# Patient Record
Sex: MALE | Race: WHITE | NOT HISPANIC OR LATINO | Employment: FULL TIME | URBAN - METROPOLITAN AREA
[De-identification: names, ages, dates, MRNs, and addresses within clinical notes are randomized per-mention and may not be internally consistent; named-entity substitution may affect disease eponyms.]

---

## 2017-01-24 ENCOUNTER — HOSPITAL ENCOUNTER (OUTPATIENT)
Facility: HOSPITAL | Age: 47
Setting detail: OUTPATIENT SURGERY
Discharge: HOME | End: 2017-01-24
Attending: ORTHOPAEDIC SURGERY | Admitting: ORTHOPAEDIC SURGERY
Payer: OTHER MISCELLANEOUS

## 2017-01-24 ENCOUNTER — ANESTHESIA (OUTPATIENT)
Dept: PERIOP | Facility: HOSPITAL | Age: 47
End: 2017-01-24
Payer: OTHER MISCELLANEOUS

## 2017-01-24 ENCOUNTER — ANESTHESIA EVENT (OUTPATIENT)
Dept: PERIOP | Facility: HOSPITAL | Age: 47
End: 2017-01-24
Payer: OTHER MISCELLANEOUS

## 2017-01-24 VITALS
TEMPERATURE: 99.3 F | BODY MASS INDEX: 26.77 KG/M2 | OXYGEN SATURATION: 94 % | HEIGHT: 70 IN | DIASTOLIC BLOOD PRESSURE: 99 MMHG | WEIGHT: 187 LBS | HEART RATE: 90 BPM | SYSTOLIC BLOOD PRESSURE: 147 MMHG | RESPIRATION RATE: 18 BRPM

## 2017-01-24 VITALS — DIASTOLIC BLOOD PRESSURE: 77 MMHG | OXYGEN SATURATION: 97 % | HEART RATE: 77 BPM | SYSTOLIC BLOOD PRESSURE: 139 MMHG

## 2017-01-24 PROBLEM — M24.131 ARTICULAR CARTILAGE DISORDER OF RIGHT WRIST: Status: ACTIVE | Noted: 2017-01-24

## 2017-01-24 PROCEDURE — C1713 ANCHOR/SCREW BN/BN,TIS/BN: HCPCS | Performed by: ORTHOPAEDIC SURGERY

## 2017-01-24 DEVICE — TFCC FAST-FIX KIT INCLUDES                                    FAST-FIX 360 DELIVERY DEVICE,                                    DISPOSABLE SLOTTED CANNULA, AND KNOT                                    PUSHER/SUTURE CUTTER
Type: IMPLANTABLE DEVICE | Site: WRIST | Status: FUNCTIONAL
Brand: FAST-FIX

## 2017-01-24 RX ORDER — FENTANYL CITRATE/PF 50 MCG/ML
25 SYRINGE (ML) INJECTION
Status: DISCONTINUED | OUTPATIENT
Start: 2017-01-24 | End: 2017-01-24 | Stop reason: HOSPADM

## 2017-01-24 RX ORDER — ONDANSETRON 2 MG/ML
INJECTION INTRAMUSCULAR; INTRAVENOUS AS NEEDED
Status: DISCONTINUED | OUTPATIENT
Start: 2017-01-24 | End: 2017-01-24 | Stop reason: SURG

## 2017-01-24 RX ORDER — PROPOFOL 10 MG/ML
INJECTION, EMULSION INTRAVENOUS AS NEEDED
Status: DISCONTINUED | OUTPATIENT
Start: 2017-01-24 | End: 2017-01-24 | Stop reason: SURG

## 2017-01-24 RX ORDER — ALBUTEROL SULFATE 90 UG/1
AEROSOL, METERED RESPIRATORY (INHALATION) AS NEEDED
Status: DISCONTINUED | OUTPATIENT
Start: 2017-01-24 | End: 2017-01-24 | Stop reason: SURG

## 2017-01-24 RX ORDER — MIDAZOLAM HYDROCHLORIDE 1 MG/ML
INJECTION INTRAMUSCULAR; INTRAVENOUS
Status: COMPLETED
Start: 2017-01-24 | End: 2017-01-24

## 2017-01-24 RX ORDER — SODIUM CHLORIDE, SODIUM LACTATE, POTASSIUM CHLORIDE, CALCIUM CHLORIDE 600; 310; 30; 20 MG/100ML; MG/100ML; MG/100ML; MG/100ML
20 INJECTION, SOLUTION INTRAVENOUS CONTINUOUS
Status: DISCONTINUED | OUTPATIENT
Start: 2017-01-24 | End: 2017-01-24

## 2017-01-24 RX ORDER — LIDOCAINE HYDROCHLORIDE 10 MG/ML
INJECTION, SOLUTION INFILTRATION; PERINEURAL AS NEEDED
Status: DISCONTINUED | OUTPATIENT
Start: 2017-01-24 | End: 2017-01-24 | Stop reason: SURG

## 2017-01-24 RX ORDER — FENTANYL CITRATE 50 UG/ML
INJECTION, SOLUTION INTRAMUSCULAR; INTRAVENOUS AS NEEDED
Status: DISCONTINUED | OUTPATIENT
Start: 2017-01-24 | End: 2017-01-24 | Stop reason: SURG

## 2017-01-24 RX ORDER — HYDROCODONE BITARTRATE AND ACETAMINOPHEN 5; 325 MG/1; MG/1
2 TABLET ORAL EVERY 6 HOURS PRN
Qty: 40 TABLET | Refills: 0 | Status: SHIPPED | OUTPATIENT
Start: 2017-01-24 | End: 2017-02-03

## 2017-01-24 RX ORDER — SODIUM CHLORIDE, SODIUM LACTATE, POTASSIUM CHLORIDE, CALCIUM CHLORIDE 600; 310; 30; 20 MG/100ML; MG/100ML; MG/100ML; MG/100ML
100 INJECTION, SOLUTION INTRAVENOUS CONTINUOUS
Status: DISCONTINUED | OUTPATIENT
Start: 2017-01-24 | End: 2017-01-24 | Stop reason: HOSPADM

## 2017-01-24 RX ORDER — MAGNESIUM HYDROXIDE 1200 MG/15ML
LIQUID ORAL AS NEEDED
Status: DISCONTINUED | OUTPATIENT
Start: 2017-01-24 | End: 2017-01-24 | Stop reason: HOSPADM

## 2017-01-24 RX ORDER — HYDROCODONE BITARTRATE AND ACETAMINOPHEN 5; 325 MG/1; MG/1
2 TABLET ORAL EVERY 6 HOURS PRN
Status: DISCONTINUED | OUTPATIENT
Start: 2017-01-24 | End: 2017-01-24 | Stop reason: HOSPADM

## 2017-01-24 RX ORDER — FENTANYL CITRATE 50 UG/ML
INJECTION, SOLUTION INTRAMUSCULAR; INTRAVENOUS
Status: COMPLETED
Start: 2017-01-24 | End: 2017-01-24

## 2017-01-24 RX ADMIN — HYDROCODONE BITARTRATE AND ACETAMINOPHEN 2 TABLET: 5; 325 TABLET ORAL at 14:01

## 2017-01-24 RX ADMIN — PROPOFOL 200 MG: 10 INJECTION, EMULSION INTRAVENOUS at 11:29

## 2017-01-24 RX ADMIN — FENTANYL CITRATE 25 MCG: 50 INJECTION, SOLUTION INTRAMUSCULAR; INTRAVENOUS at 11:39

## 2017-01-24 RX ADMIN — ALBUTEROL SULFATE 4 PUFF: 90 AEROSOL, METERED RESPIRATORY (INHALATION) at 11:43

## 2017-01-24 RX ADMIN — ALBUTEROL SULFATE 4 PUFF: 90 AEROSOL, METERED RESPIRATORY (INHALATION) at 11:40

## 2017-01-24 RX ADMIN — SODIUM CHLORIDE, SODIUM LACTATE, POTASSIUM CHLORIDE, AND CALCIUM CHLORIDE 20 ML/HR: .6; .31; .03; .02 INJECTION, SOLUTION INTRAVENOUS at 09:00

## 2017-01-24 RX ADMIN — FENTANYL CITRATE 50 MCG: 50 INJECTION INTRAMUSCULAR; INTRAVENOUS at 10:40

## 2017-01-24 RX ADMIN — CEFAZOLIN SODIUM 2000 MG: 2 SOLUTION INTRAVENOUS at 11:33

## 2017-01-24 RX ADMIN — DEXAMETHASONE SODIUM PHOSPHATE 10 MG: 10 INJECTION INTRAMUSCULAR; INTRAVENOUS at 11:48

## 2017-01-24 RX ADMIN — MIDAZOLAM HYDROCHLORIDE 2 MG: 1 INJECTION, SOLUTION INTRAMUSCULAR; INTRAVENOUS at 10:39

## 2017-01-24 RX ADMIN — LIDOCAINE HYDROCHLORIDE 50 MG: 10 INJECTION, SOLUTION INFILTRATION; PERINEURAL at 11:29

## 2017-01-24 RX ADMIN — ONDANSETRON 4 MG: 2 INJECTION INTRAMUSCULAR; INTRAVENOUS at 12:32

## 2017-01-24 RX ADMIN — FENTANYL CITRATE 25 MCG: 50 INJECTION, SOLUTION INTRAMUSCULAR; INTRAVENOUS at 11:33

## 2017-02-06 ENCOUNTER — ALLSCRIPTS OFFICE VISIT (OUTPATIENT)
Dept: OTHER | Facility: OTHER | Age: 47
End: 2017-02-06

## 2017-03-06 ENCOUNTER — ALLSCRIPTS OFFICE VISIT (OUTPATIENT)
Dept: OTHER | Facility: OTHER | Age: 47
End: 2017-03-06

## 2017-03-06 DIAGNOSIS — S69.80XA OTHER SPECIFIED INJURIES OF UNSPECIFIED WRIST, HAND AND FINGER(S), INITIAL ENCOUNTER: ICD-10-CM

## 2017-03-20 ENCOUNTER — APPOINTMENT (OUTPATIENT)
Dept: OCCUPATIONAL THERAPY | Facility: CLINIC | Age: 47
End: 2017-03-20
Payer: OTHER MISCELLANEOUS

## 2017-03-20 DIAGNOSIS — S69.80XA OTHER SPECIFIED INJURIES OF UNSPECIFIED WRIST, HAND AND FINGER(S), INITIAL ENCOUNTER: ICD-10-CM

## 2017-03-20 PROCEDURE — 97010 HOT OR COLD PACKS THERAPY: CPT

## 2017-03-20 PROCEDURE — 97165 OT EVAL LOW COMPLEX 30 MIN: CPT

## 2017-03-20 PROCEDURE — 97140 MANUAL THERAPY 1/> REGIONS: CPT

## 2017-03-22 ENCOUNTER — APPOINTMENT (OUTPATIENT)
Dept: OCCUPATIONAL THERAPY | Facility: CLINIC | Age: 47
End: 2017-03-22
Payer: OTHER MISCELLANEOUS

## 2017-03-22 PROCEDURE — 97010 HOT OR COLD PACKS THERAPY: CPT

## 2017-03-22 PROCEDURE — 97140 MANUAL THERAPY 1/> REGIONS: CPT

## 2017-03-22 PROCEDURE — 97110 THERAPEUTIC EXERCISES: CPT

## 2017-03-23 ENCOUNTER — APPOINTMENT (OUTPATIENT)
Dept: OCCUPATIONAL THERAPY | Facility: CLINIC | Age: 47
End: 2017-03-23
Payer: OTHER MISCELLANEOUS

## 2017-03-23 PROCEDURE — 97110 THERAPEUTIC EXERCISES: CPT

## 2017-03-23 PROCEDURE — 97140 MANUAL THERAPY 1/> REGIONS: CPT

## 2017-03-23 PROCEDURE — 97010 HOT OR COLD PACKS THERAPY: CPT

## 2017-03-27 ENCOUNTER — APPOINTMENT (OUTPATIENT)
Dept: OCCUPATIONAL THERAPY | Facility: CLINIC | Age: 47
End: 2017-03-27
Payer: OTHER MISCELLANEOUS

## 2017-03-27 PROCEDURE — 97010 HOT OR COLD PACKS THERAPY: CPT

## 2017-03-27 PROCEDURE — 97110 THERAPEUTIC EXERCISES: CPT

## 2017-03-27 PROCEDURE — 97022 WHIRLPOOL THERAPY: CPT

## 2017-03-27 PROCEDURE — 97140 MANUAL THERAPY 1/> REGIONS: CPT

## 2017-03-29 ENCOUNTER — APPOINTMENT (OUTPATIENT)
Dept: OCCUPATIONAL THERAPY | Facility: CLINIC | Age: 47
End: 2017-03-29
Payer: OTHER MISCELLANEOUS

## 2017-03-29 PROCEDURE — 97010 HOT OR COLD PACKS THERAPY: CPT

## 2017-03-29 PROCEDURE — 97140 MANUAL THERAPY 1/> REGIONS: CPT

## 2017-03-29 PROCEDURE — 97022 WHIRLPOOL THERAPY: CPT

## 2017-03-29 PROCEDURE — 97110 THERAPEUTIC EXERCISES: CPT

## 2017-03-31 ENCOUNTER — APPOINTMENT (OUTPATIENT)
Dept: OCCUPATIONAL THERAPY | Facility: CLINIC | Age: 47
End: 2017-03-31
Payer: OTHER MISCELLANEOUS

## 2017-03-31 PROCEDURE — 97010 HOT OR COLD PACKS THERAPY: CPT

## 2017-03-31 PROCEDURE — 97140 MANUAL THERAPY 1/> REGIONS: CPT

## 2017-03-31 PROCEDURE — 97022 WHIRLPOOL THERAPY: CPT

## 2017-03-31 PROCEDURE — 97110 THERAPEUTIC EXERCISES: CPT

## 2017-04-03 ENCOUNTER — APPOINTMENT (OUTPATIENT)
Dept: OCCUPATIONAL THERAPY | Facility: CLINIC | Age: 47
End: 2017-04-03
Payer: OTHER MISCELLANEOUS

## 2017-04-03 PROCEDURE — 97022 WHIRLPOOL THERAPY: CPT

## 2017-04-03 PROCEDURE — 97110 THERAPEUTIC EXERCISES: CPT

## 2017-04-03 PROCEDURE — 97035 APP MDLTY 1+ULTRASOUND EA 15: CPT

## 2017-04-03 PROCEDURE — 97140 MANUAL THERAPY 1/> REGIONS: CPT

## 2017-04-05 ENCOUNTER — APPOINTMENT (OUTPATIENT)
Dept: OCCUPATIONAL THERAPY | Facility: CLINIC | Age: 47
End: 2017-04-05
Payer: OTHER MISCELLANEOUS

## 2017-04-05 PROCEDURE — 97140 MANUAL THERAPY 1/> REGIONS: CPT

## 2017-04-05 PROCEDURE — 97010 HOT OR COLD PACKS THERAPY: CPT

## 2017-04-05 PROCEDURE — 97035 APP MDLTY 1+ULTRASOUND EA 15: CPT

## 2017-04-05 PROCEDURE — 97110 THERAPEUTIC EXERCISES: CPT

## 2017-04-06 ENCOUNTER — APPOINTMENT (OUTPATIENT)
Dept: OCCUPATIONAL THERAPY | Facility: CLINIC | Age: 47
End: 2017-04-06
Payer: OTHER MISCELLANEOUS

## 2017-04-06 PROCEDURE — 97140 MANUAL THERAPY 1/> REGIONS: CPT

## 2017-04-06 PROCEDURE — 97010 HOT OR COLD PACKS THERAPY: CPT

## 2017-04-06 PROCEDURE — 97035 APP MDLTY 1+ULTRASOUND EA 15: CPT

## 2017-04-06 PROCEDURE — 97110 THERAPEUTIC EXERCISES: CPT

## 2017-04-07 ENCOUNTER — APPOINTMENT (OUTPATIENT)
Dept: OCCUPATIONAL THERAPY | Facility: CLINIC | Age: 47
End: 2017-04-07
Payer: OTHER MISCELLANEOUS

## 2017-04-10 ENCOUNTER — APPOINTMENT (OUTPATIENT)
Dept: OCCUPATIONAL THERAPY | Facility: CLINIC | Age: 47
End: 2017-04-10
Payer: OTHER MISCELLANEOUS

## 2017-04-10 PROCEDURE — 97014 ELECTRIC STIMULATION THERAPY: CPT

## 2017-04-10 PROCEDURE — 97140 MANUAL THERAPY 1/> REGIONS: CPT

## 2017-04-10 PROCEDURE — 97035 APP MDLTY 1+ULTRASOUND EA 15: CPT

## 2017-04-10 PROCEDURE — 97110 THERAPEUTIC EXERCISES: CPT

## 2017-04-12 ENCOUNTER — APPOINTMENT (OUTPATIENT)
Dept: OCCUPATIONAL THERAPY | Facility: CLINIC | Age: 47
End: 2017-04-12
Payer: OTHER MISCELLANEOUS

## 2017-04-12 PROCEDURE — 97110 THERAPEUTIC EXERCISES: CPT

## 2017-04-12 PROCEDURE — 97140 MANUAL THERAPY 1/> REGIONS: CPT

## 2017-04-12 PROCEDURE — 97035 APP MDLTY 1+ULTRASOUND EA 15: CPT

## 2017-04-12 PROCEDURE — 97014 ELECTRIC STIMULATION THERAPY: CPT

## 2017-04-14 ENCOUNTER — APPOINTMENT (OUTPATIENT)
Dept: OCCUPATIONAL THERAPY | Facility: CLINIC | Age: 47
End: 2017-04-14
Payer: OTHER MISCELLANEOUS

## 2017-04-14 PROCEDURE — 97140 MANUAL THERAPY 1/> REGIONS: CPT

## 2017-04-14 PROCEDURE — 97014 ELECTRIC STIMULATION THERAPY: CPT

## 2017-04-14 PROCEDURE — 97110 THERAPEUTIC EXERCISES: CPT

## 2017-04-14 PROCEDURE — 97035 APP MDLTY 1+ULTRASOUND EA 15: CPT

## 2017-04-17 ENCOUNTER — APPOINTMENT (OUTPATIENT)
Dept: OCCUPATIONAL THERAPY | Facility: CLINIC | Age: 47
End: 2017-04-17
Payer: OTHER MISCELLANEOUS

## 2017-04-17 ENCOUNTER — ALLSCRIPTS OFFICE VISIT (OUTPATIENT)
Dept: OTHER | Facility: OTHER | Age: 47
End: 2017-04-17

## 2017-04-17 DIAGNOSIS — S69.80XA OTHER SPECIFIED INJURIES OF UNSPECIFIED WRIST, HAND AND FINGER(S), INITIAL ENCOUNTER: ICD-10-CM

## 2017-04-17 PROCEDURE — 97014 ELECTRIC STIMULATION THERAPY: CPT

## 2017-04-17 PROCEDURE — 97010 HOT OR COLD PACKS THERAPY: CPT

## 2017-04-17 PROCEDURE — 97110 THERAPEUTIC EXERCISES: CPT

## 2017-04-17 PROCEDURE — 97140 MANUAL THERAPY 1/> REGIONS: CPT

## 2017-04-19 ENCOUNTER — APPOINTMENT (OUTPATIENT)
Dept: OCCUPATIONAL THERAPY | Facility: CLINIC | Age: 47
End: 2017-04-19
Payer: OTHER MISCELLANEOUS

## 2017-04-19 PROCEDURE — 97110 THERAPEUTIC EXERCISES: CPT

## 2017-04-19 PROCEDURE — 97140 MANUAL THERAPY 1/> REGIONS: CPT

## 2017-04-19 PROCEDURE — 97010 HOT OR COLD PACKS THERAPY: CPT

## 2017-04-19 PROCEDURE — 97014 ELECTRIC STIMULATION THERAPY: CPT

## 2017-04-21 ENCOUNTER — APPOINTMENT (OUTPATIENT)
Dept: OCCUPATIONAL THERAPY | Facility: CLINIC | Age: 47
End: 2017-04-21
Payer: OTHER MISCELLANEOUS

## 2017-04-21 PROCEDURE — 97110 THERAPEUTIC EXERCISES: CPT

## 2017-04-21 PROCEDURE — 97140 MANUAL THERAPY 1/> REGIONS: CPT

## 2017-04-21 PROCEDURE — 97014 ELECTRIC STIMULATION THERAPY: CPT

## 2017-04-21 PROCEDURE — 97022 WHIRLPOOL THERAPY: CPT

## 2017-04-24 ENCOUNTER — APPOINTMENT (OUTPATIENT)
Dept: OCCUPATIONAL THERAPY | Facility: CLINIC | Age: 47
End: 2017-04-24
Payer: OTHER MISCELLANEOUS

## 2017-04-24 PROCEDURE — 97035 APP MDLTY 1+ULTRASOUND EA 15: CPT

## 2017-04-24 PROCEDURE — 97140 MANUAL THERAPY 1/> REGIONS: CPT

## 2017-04-24 PROCEDURE — 97014 ELECTRIC STIMULATION THERAPY: CPT

## 2017-04-24 PROCEDURE — 97110 THERAPEUTIC EXERCISES: CPT

## 2017-04-26 ENCOUNTER — APPOINTMENT (OUTPATIENT)
Dept: OCCUPATIONAL THERAPY | Facility: CLINIC | Age: 47
End: 2017-04-26
Payer: OTHER MISCELLANEOUS

## 2017-04-26 PROCEDURE — 97014 ELECTRIC STIMULATION THERAPY: CPT

## 2017-04-26 PROCEDURE — 97010 HOT OR COLD PACKS THERAPY: CPT

## 2017-04-26 PROCEDURE — 97110 THERAPEUTIC EXERCISES: CPT

## 2017-04-26 PROCEDURE — 97140 MANUAL THERAPY 1/> REGIONS: CPT

## 2017-04-28 ENCOUNTER — APPOINTMENT (OUTPATIENT)
Dept: OCCUPATIONAL THERAPY | Facility: CLINIC | Age: 47
End: 2017-04-28
Payer: OTHER MISCELLANEOUS

## 2017-04-28 PROCEDURE — 97110 THERAPEUTIC EXERCISES: CPT

## 2017-04-28 PROCEDURE — 97140 MANUAL THERAPY 1/> REGIONS: CPT

## 2017-05-01 ENCOUNTER — APPOINTMENT (OUTPATIENT)
Dept: OCCUPATIONAL THERAPY | Facility: CLINIC | Age: 47
End: 2017-05-01
Payer: OTHER MISCELLANEOUS

## 2017-05-01 PROCEDURE — 97010 HOT OR COLD PACKS THERAPY: CPT

## 2017-05-01 PROCEDURE — 97014 ELECTRIC STIMULATION THERAPY: CPT

## 2017-05-01 PROCEDURE — 97140 MANUAL THERAPY 1/> REGIONS: CPT

## 2017-05-01 PROCEDURE — 97110 THERAPEUTIC EXERCISES: CPT

## 2017-05-03 ENCOUNTER — APPOINTMENT (OUTPATIENT)
Dept: OCCUPATIONAL THERAPY | Facility: CLINIC | Age: 47
End: 2017-05-03
Payer: OTHER MISCELLANEOUS

## 2017-05-03 PROCEDURE — 97140 MANUAL THERAPY 1/> REGIONS: CPT

## 2017-05-03 PROCEDURE — 97014 ELECTRIC STIMULATION THERAPY: CPT

## 2017-05-03 PROCEDURE — 97110 THERAPEUTIC EXERCISES: CPT

## 2017-05-05 ENCOUNTER — APPOINTMENT (OUTPATIENT)
Dept: OCCUPATIONAL THERAPY | Facility: CLINIC | Age: 47
End: 2017-05-05
Payer: OTHER MISCELLANEOUS

## 2017-05-05 PROCEDURE — 97110 THERAPEUTIC EXERCISES: CPT

## 2017-05-05 PROCEDURE — 97010 HOT OR COLD PACKS THERAPY: CPT

## 2017-05-05 PROCEDURE — 97140 MANUAL THERAPY 1/> REGIONS: CPT

## 2017-05-08 ENCOUNTER — APPOINTMENT (OUTPATIENT)
Dept: OCCUPATIONAL THERAPY | Facility: CLINIC | Age: 47
End: 2017-05-08
Payer: OTHER MISCELLANEOUS

## 2017-05-08 PROCEDURE — 97140 MANUAL THERAPY 1/> REGIONS: CPT

## 2017-05-08 PROCEDURE — 97110 THERAPEUTIC EXERCISES: CPT

## 2017-05-08 PROCEDURE — 97035 APP MDLTY 1+ULTRASOUND EA 15: CPT

## 2017-05-08 PROCEDURE — 97014 ELECTRIC STIMULATION THERAPY: CPT

## 2017-05-10 ENCOUNTER — APPOINTMENT (OUTPATIENT)
Dept: OCCUPATIONAL THERAPY | Facility: CLINIC | Age: 47
End: 2017-05-10
Payer: OTHER MISCELLANEOUS

## 2017-05-10 PROCEDURE — 97035 APP MDLTY 1+ULTRASOUND EA 15: CPT

## 2017-05-10 PROCEDURE — 97014 ELECTRIC STIMULATION THERAPY: CPT

## 2017-05-10 PROCEDURE — 97110 THERAPEUTIC EXERCISES: CPT

## 2017-05-10 PROCEDURE — 97140 MANUAL THERAPY 1/> REGIONS: CPT

## 2017-05-15 ENCOUNTER — APPOINTMENT (OUTPATIENT)
Dept: OCCUPATIONAL THERAPY | Facility: CLINIC | Age: 47
End: 2017-05-15
Payer: OTHER MISCELLANEOUS

## 2017-05-15 PROCEDURE — 97140 MANUAL THERAPY 1/> REGIONS: CPT

## 2017-05-15 PROCEDURE — 97110 THERAPEUTIC EXERCISES: CPT

## 2017-05-15 PROCEDURE — 97014 ELECTRIC STIMULATION THERAPY: CPT

## 2017-05-17 ENCOUNTER — APPOINTMENT (OUTPATIENT)
Dept: OCCUPATIONAL THERAPY | Facility: CLINIC | Age: 47
End: 2017-05-17
Payer: OTHER MISCELLANEOUS

## 2017-05-17 PROCEDURE — 97140 MANUAL THERAPY 1/> REGIONS: CPT

## 2017-05-17 PROCEDURE — 97010 HOT OR COLD PACKS THERAPY: CPT

## 2017-05-17 PROCEDURE — 97110 THERAPEUTIC EXERCISES: CPT

## 2017-05-19 ENCOUNTER — APPOINTMENT (OUTPATIENT)
Dept: OCCUPATIONAL THERAPY | Facility: CLINIC | Age: 47
End: 2017-05-19
Payer: OTHER MISCELLANEOUS

## 2017-05-19 PROCEDURE — 97140 MANUAL THERAPY 1/> REGIONS: CPT

## 2017-05-19 PROCEDURE — 97010 HOT OR COLD PACKS THERAPY: CPT

## 2017-05-19 PROCEDURE — 97110 THERAPEUTIC EXERCISES: CPT

## 2017-05-22 ENCOUNTER — APPOINTMENT (OUTPATIENT)
Dept: OCCUPATIONAL THERAPY | Facility: CLINIC | Age: 47
End: 2017-05-22
Payer: OTHER MISCELLANEOUS

## 2017-05-22 PROCEDURE — 97010 HOT OR COLD PACKS THERAPY: CPT

## 2017-05-22 PROCEDURE — 97140 MANUAL THERAPY 1/> REGIONS: CPT

## 2017-05-22 PROCEDURE — 97110 THERAPEUTIC EXERCISES: CPT

## 2017-05-24 ENCOUNTER — APPOINTMENT (OUTPATIENT)
Dept: OCCUPATIONAL THERAPY | Facility: CLINIC | Age: 47
End: 2017-05-24
Payer: OTHER MISCELLANEOUS

## 2017-05-24 PROCEDURE — 97140 MANUAL THERAPY 1/> REGIONS: CPT

## 2017-05-24 PROCEDURE — 97110 THERAPEUTIC EXERCISES: CPT

## 2017-05-24 PROCEDURE — 97010 HOT OR COLD PACKS THERAPY: CPT

## 2017-05-26 ENCOUNTER — APPOINTMENT (OUTPATIENT)
Dept: OCCUPATIONAL THERAPY | Facility: CLINIC | Age: 47
End: 2017-05-26
Payer: OTHER MISCELLANEOUS

## 2017-05-26 PROCEDURE — 97110 THERAPEUTIC EXERCISES: CPT

## 2017-05-26 PROCEDURE — 97140 MANUAL THERAPY 1/> REGIONS: CPT

## 2017-05-26 PROCEDURE — 97010 HOT OR COLD PACKS THERAPY: CPT

## 2017-05-31 ENCOUNTER — APPOINTMENT (OUTPATIENT)
Dept: OCCUPATIONAL THERAPY | Facility: CLINIC | Age: 47
End: 2017-05-31
Payer: OTHER MISCELLANEOUS

## 2017-05-31 PROCEDURE — 97014 ELECTRIC STIMULATION THERAPY: CPT

## 2017-05-31 PROCEDURE — 97110 THERAPEUTIC EXERCISES: CPT

## 2017-05-31 PROCEDURE — 97140 MANUAL THERAPY 1/> REGIONS: CPT

## 2017-06-01 ENCOUNTER — APPOINTMENT (OUTPATIENT)
Dept: OCCUPATIONAL THERAPY | Facility: CLINIC | Age: 47
End: 2017-06-01
Payer: OTHER MISCELLANEOUS

## 2017-06-01 PROCEDURE — 97110 THERAPEUTIC EXERCISES: CPT

## 2017-06-01 PROCEDURE — 97014 ELECTRIC STIMULATION THERAPY: CPT

## 2017-06-01 PROCEDURE — 97140 MANUAL THERAPY 1/> REGIONS: CPT

## 2017-06-02 ENCOUNTER — APPOINTMENT (OUTPATIENT)
Dept: OCCUPATIONAL THERAPY | Facility: CLINIC | Age: 47
End: 2017-06-02
Payer: OTHER MISCELLANEOUS

## 2017-06-05 ENCOUNTER — ALLSCRIPTS OFFICE VISIT (OUTPATIENT)
Dept: OTHER | Facility: OTHER | Age: 47
End: 2017-06-05

## 2017-06-07 ENCOUNTER — APPOINTMENT (OUTPATIENT)
Dept: OCCUPATIONAL THERAPY | Facility: CLINIC | Age: 47
End: 2017-06-07
Payer: OTHER MISCELLANEOUS

## 2017-06-07 PROCEDURE — 97140 MANUAL THERAPY 1/> REGIONS: CPT

## 2017-06-07 PROCEDURE — 97035 APP MDLTY 1+ULTRASOUND EA 15: CPT

## 2017-06-07 PROCEDURE — 97110 THERAPEUTIC EXERCISES: CPT

## 2017-06-07 PROCEDURE — 97010 HOT OR COLD PACKS THERAPY: CPT

## 2017-06-09 ENCOUNTER — APPOINTMENT (OUTPATIENT)
Dept: OCCUPATIONAL THERAPY | Facility: CLINIC | Age: 47
End: 2017-06-09
Payer: OTHER MISCELLANEOUS

## 2017-06-09 PROCEDURE — 97010 HOT OR COLD PACKS THERAPY: CPT

## 2017-06-09 PROCEDURE — 97110 THERAPEUTIC EXERCISES: CPT

## 2017-06-09 PROCEDURE — 97140 MANUAL THERAPY 1/> REGIONS: CPT

## 2017-06-09 PROCEDURE — 97035 APP MDLTY 1+ULTRASOUND EA 15: CPT

## 2017-06-12 ENCOUNTER — APPOINTMENT (OUTPATIENT)
Dept: OCCUPATIONAL THERAPY | Facility: CLINIC | Age: 47
End: 2017-06-12
Payer: OTHER MISCELLANEOUS

## 2017-06-12 PROCEDURE — 97140 MANUAL THERAPY 1/> REGIONS: CPT

## 2017-06-12 PROCEDURE — 97110 THERAPEUTIC EXERCISES: CPT

## 2017-06-12 PROCEDURE — 97010 HOT OR COLD PACKS THERAPY: CPT

## 2017-06-14 ENCOUNTER — APPOINTMENT (OUTPATIENT)
Dept: OCCUPATIONAL THERAPY | Facility: CLINIC | Age: 47
End: 2017-06-14
Payer: OTHER MISCELLANEOUS

## 2017-06-14 PROCEDURE — 97140 MANUAL THERAPY 1/> REGIONS: CPT

## 2017-06-14 PROCEDURE — 97010 HOT OR COLD PACKS THERAPY: CPT

## 2017-06-14 PROCEDURE — 97110 THERAPEUTIC EXERCISES: CPT

## 2017-06-16 ENCOUNTER — APPOINTMENT (OUTPATIENT)
Dept: OCCUPATIONAL THERAPY | Facility: CLINIC | Age: 47
End: 2017-06-16
Payer: OTHER MISCELLANEOUS

## 2017-06-16 PROCEDURE — 97010 HOT OR COLD PACKS THERAPY: CPT

## 2017-06-16 PROCEDURE — 97110 THERAPEUTIC EXERCISES: CPT

## 2017-06-16 PROCEDURE — 97140 MANUAL THERAPY 1/> REGIONS: CPT

## 2017-06-19 ENCOUNTER — APPOINTMENT (OUTPATIENT)
Dept: OCCUPATIONAL THERAPY | Facility: CLINIC | Age: 47
End: 2017-06-19
Payer: OTHER MISCELLANEOUS

## 2017-06-21 ENCOUNTER — APPOINTMENT (OUTPATIENT)
Dept: OCCUPATIONAL THERAPY | Facility: CLINIC | Age: 47
End: 2017-06-21
Payer: OTHER MISCELLANEOUS

## 2017-06-21 PROCEDURE — 97010 HOT OR COLD PACKS THERAPY: CPT

## 2017-06-21 PROCEDURE — 97110 THERAPEUTIC EXERCISES: CPT

## 2017-06-21 PROCEDURE — 97140 MANUAL THERAPY 1/> REGIONS: CPT

## 2017-06-23 ENCOUNTER — APPOINTMENT (OUTPATIENT)
Dept: OCCUPATIONAL THERAPY | Facility: CLINIC | Age: 47
End: 2017-06-23
Payer: OTHER MISCELLANEOUS

## 2017-06-23 PROCEDURE — 97110 THERAPEUTIC EXERCISES: CPT

## 2017-06-23 PROCEDURE — 97010 HOT OR COLD PACKS THERAPY: CPT

## 2017-06-23 PROCEDURE — 97140 MANUAL THERAPY 1/> REGIONS: CPT

## 2017-06-26 ENCOUNTER — APPOINTMENT (OUTPATIENT)
Dept: OCCUPATIONAL THERAPY | Facility: CLINIC | Age: 47
End: 2017-06-26
Payer: OTHER MISCELLANEOUS

## 2017-06-27 ENCOUNTER — APPOINTMENT (OUTPATIENT)
Dept: OCCUPATIONAL THERAPY | Facility: CLINIC | Age: 47
End: 2017-06-27
Payer: OTHER MISCELLANEOUS

## 2017-06-27 PROCEDURE — 97110 THERAPEUTIC EXERCISES: CPT

## 2017-06-27 PROCEDURE — 97010 HOT OR COLD PACKS THERAPY: CPT

## 2017-06-27 PROCEDURE — 97140 MANUAL THERAPY 1/> REGIONS: CPT

## 2017-06-28 ENCOUNTER — APPOINTMENT (OUTPATIENT)
Dept: OCCUPATIONAL THERAPY | Facility: CLINIC | Age: 47
End: 2017-06-28
Payer: OTHER MISCELLANEOUS

## 2017-06-28 PROCEDURE — 97110 THERAPEUTIC EXERCISES: CPT

## 2017-06-28 PROCEDURE — 97010 HOT OR COLD PACKS THERAPY: CPT

## 2017-06-28 PROCEDURE — 97140 MANUAL THERAPY 1/> REGIONS: CPT

## 2017-06-30 ENCOUNTER — APPOINTMENT (OUTPATIENT)
Dept: OCCUPATIONAL THERAPY | Facility: CLINIC | Age: 47
End: 2017-06-30
Payer: OTHER MISCELLANEOUS

## 2017-06-30 PROCEDURE — 97110 THERAPEUTIC EXERCISES: CPT

## 2017-06-30 PROCEDURE — 97140 MANUAL THERAPY 1/> REGIONS: CPT

## 2017-06-30 PROCEDURE — 97010 HOT OR COLD PACKS THERAPY: CPT

## 2017-07-06 ENCOUNTER — APPOINTMENT (OUTPATIENT)
Dept: OCCUPATIONAL THERAPY | Facility: CLINIC | Age: 47
End: 2017-07-06
Payer: OTHER MISCELLANEOUS

## 2017-07-06 PROCEDURE — 97140 MANUAL THERAPY 1/> REGIONS: CPT

## 2017-07-06 PROCEDURE — 97010 HOT OR COLD PACKS THERAPY: CPT

## 2017-07-06 PROCEDURE — 97110 THERAPEUTIC EXERCISES: CPT

## 2017-07-07 ENCOUNTER — APPOINTMENT (OUTPATIENT)
Dept: OCCUPATIONAL THERAPY | Facility: CLINIC | Age: 47
End: 2017-07-07
Payer: OTHER MISCELLANEOUS

## 2017-07-07 PROCEDURE — 97140 MANUAL THERAPY 1/> REGIONS: CPT

## 2017-07-07 PROCEDURE — 97110 THERAPEUTIC EXERCISES: CPT

## 2017-07-07 PROCEDURE — 97010 HOT OR COLD PACKS THERAPY: CPT

## 2017-07-10 ENCOUNTER — APPOINTMENT (OUTPATIENT)
Dept: OCCUPATIONAL THERAPY | Facility: CLINIC | Age: 47
End: 2017-07-10
Payer: OTHER MISCELLANEOUS

## 2017-07-12 ENCOUNTER — APPOINTMENT (OUTPATIENT)
Dept: OCCUPATIONAL THERAPY | Facility: CLINIC | Age: 47
End: 2017-07-12
Payer: OTHER MISCELLANEOUS

## 2017-07-12 PROCEDURE — 97110 THERAPEUTIC EXERCISES: CPT

## 2017-07-12 PROCEDURE — 97010 HOT OR COLD PACKS THERAPY: CPT

## 2017-07-12 PROCEDURE — 97140 MANUAL THERAPY 1/> REGIONS: CPT

## 2017-07-13 ENCOUNTER — APPOINTMENT (OUTPATIENT)
Dept: OCCUPATIONAL THERAPY | Facility: CLINIC | Age: 47
End: 2017-07-13
Payer: OTHER MISCELLANEOUS

## 2017-07-17 ENCOUNTER — APPOINTMENT (OUTPATIENT)
Dept: OCCUPATIONAL THERAPY | Facility: CLINIC | Age: 47
End: 2017-07-17
Payer: OTHER MISCELLANEOUS

## 2017-07-17 PROCEDURE — 97140 MANUAL THERAPY 1/> REGIONS: CPT

## 2017-07-17 PROCEDURE — 97110 THERAPEUTIC EXERCISES: CPT

## 2017-07-17 PROCEDURE — 97010 HOT OR COLD PACKS THERAPY: CPT

## 2017-07-19 ENCOUNTER — APPOINTMENT (OUTPATIENT)
Dept: OCCUPATIONAL THERAPY | Facility: CLINIC | Age: 47
End: 2017-07-19
Payer: OTHER MISCELLANEOUS

## 2017-07-19 PROCEDURE — 97140 MANUAL THERAPY 1/> REGIONS: CPT

## 2017-07-19 PROCEDURE — 97010 HOT OR COLD PACKS THERAPY: CPT

## 2017-07-19 PROCEDURE — 97110 THERAPEUTIC EXERCISES: CPT

## 2017-07-21 ENCOUNTER — APPOINTMENT (OUTPATIENT)
Dept: OCCUPATIONAL THERAPY | Facility: CLINIC | Age: 47
End: 2017-07-21
Payer: OTHER MISCELLANEOUS

## 2017-07-21 PROCEDURE — 97140 MANUAL THERAPY 1/> REGIONS: CPT

## 2017-07-21 PROCEDURE — 97010 HOT OR COLD PACKS THERAPY: CPT

## 2017-07-21 PROCEDURE — 97110 THERAPEUTIC EXERCISES: CPT

## 2017-07-24 ENCOUNTER — ALLSCRIPTS OFFICE VISIT (OUTPATIENT)
Dept: OTHER | Facility: OTHER | Age: 47
End: 2017-07-24

## 2017-07-24 DIAGNOSIS — S69.80XA OTHER SPECIFIED INJURIES OF UNSPECIFIED WRIST, HAND AND FINGER(S), INITIAL ENCOUNTER: ICD-10-CM

## 2017-08-01 ENCOUNTER — APPOINTMENT (OUTPATIENT)
Dept: OCCUPATIONAL THERAPY | Facility: CLINIC | Age: 47
End: 2017-08-01
Payer: OTHER MISCELLANEOUS

## 2017-08-01 PROCEDURE — 97140 MANUAL THERAPY 1/> REGIONS: CPT

## 2017-08-01 PROCEDURE — 97110 THERAPEUTIC EXERCISES: CPT

## 2017-08-01 PROCEDURE — 97010 HOT OR COLD PACKS THERAPY: CPT

## 2017-08-03 ENCOUNTER — APPOINTMENT (OUTPATIENT)
Dept: OCCUPATIONAL THERAPY | Facility: CLINIC | Age: 47
End: 2017-08-03
Payer: OTHER MISCELLANEOUS

## 2017-08-03 PROCEDURE — 97110 THERAPEUTIC EXERCISES: CPT

## 2017-08-03 PROCEDURE — 97140 MANUAL THERAPY 1/> REGIONS: CPT

## 2017-08-08 ENCOUNTER — APPOINTMENT (OUTPATIENT)
Dept: OCCUPATIONAL THERAPY | Facility: CLINIC | Age: 47
End: 2017-08-08
Payer: OTHER MISCELLANEOUS

## 2017-08-08 PROCEDURE — 97140 MANUAL THERAPY 1/> REGIONS: CPT

## 2017-08-08 PROCEDURE — 97010 HOT OR COLD PACKS THERAPY: CPT

## 2017-08-08 PROCEDURE — 97110 THERAPEUTIC EXERCISES: CPT

## 2017-08-10 ENCOUNTER — APPOINTMENT (OUTPATIENT)
Dept: OCCUPATIONAL THERAPY | Facility: CLINIC | Age: 47
End: 2017-08-10
Payer: OTHER MISCELLANEOUS

## 2017-08-10 PROCEDURE — 97010 HOT OR COLD PACKS THERAPY: CPT

## 2017-08-10 PROCEDURE — 97110 THERAPEUTIC EXERCISES: CPT

## 2017-08-10 PROCEDURE — 97140 MANUAL THERAPY 1/> REGIONS: CPT

## 2017-08-15 ENCOUNTER — APPOINTMENT (OUTPATIENT)
Dept: OCCUPATIONAL THERAPY | Facility: CLINIC | Age: 47
End: 2017-08-15
Payer: OTHER MISCELLANEOUS

## 2017-08-15 PROCEDURE — 97110 THERAPEUTIC EXERCISES: CPT

## 2017-08-15 PROCEDURE — 97010 HOT OR COLD PACKS THERAPY: CPT

## 2017-08-17 ENCOUNTER — APPOINTMENT (OUTPATIENT)
Dept: OCCUPATIONAL THERAPY | Facility: CLINIC | Age: 47
End: 2017-08-17
Payer: OTHER MISCELLANEOUS

## 2017-10-30 DIAGNOSIS — M76.51 PATELLAR TENDINITIS OF RIGHT KNEE: ICD-10-CM

## 2017-10-30 DIAGNOSIS — M25.569 PAIN IN KNEE: ICD-10-CM

## 2017-10-31 ENCOUNTER — APPOINTMENT (OUTPATIENT)
Dept: RADIOLOGY | Facility: CLINIC | Age: 47
End: 2017-10-31
Payer: COMMERCIAL

## 2017-10-31 ENCOUNTER — GENERIC CONVERSION - ENCOUNTER (OUTPATIENT)
Dept: OTHER | Facility: OTHER | Age: 47
End: 2017-10-31

## 2017-10-31 DIAGNOSIS — M25.569 PAIN IN KNEE: ICD-10-CM

## 2017-10-31 PROCEDURE — 73564 X-RAY EXAM KNEE 4 OR MORE: CPT

## 2018-01-12 VITALS
BODY MASS INDEX: 28.35 KG/M2 | DIASTOLIC BLOOD PRESSURE: 97 MMHG | HEIGHT: 70 IN | HEART RATE: 108 BPM | WEIGHT: 198 LBS | SYSTOLIC BLOOD PRESSURE: 148 MMHG

## 2018-01-12 VITALS
HEIGHT: 70 IN | WEIGHT: 196 LBS | DIASTOLIC BLOOD PRESSURE: 92 MMHG | SYSTOLIC BLOOD PRESSURE: 158 MMHG | BODY MASS INDEX: 28.06 KG/M2

## 2018-01-14 VITALS
WEIGHT: 198 LBS | HEIGHT: 70 IN | DIASTOLIC BLOOD PRESSURE: 94 MMHG | HEART RATE: 106 BPM | SYSTOLIC BLOOD PRESSURE: 128 MMHG | BODY MASS INDEX: 28.35 KG/M2

## 2018-01-14 VITALS
BODY MASS INDEX: 28.06 KG/M2 | HEIGHT: 70 IN | WEIGHT: 196 LBS | DIASTOLIC BLOOD PRESSURE: 84 MMHG | SYSTOLIC BLOOD PRESSURE: 144 MMHG

## 2018-01-15 VITALS
HEIGHT: 70 IN | SYSTOLIC BLOOD PRESSURE: 166 MMHG | HEART RATE: 116 BPM | BODY MASS INDEX: 28.06 KG/M2 | DIASTOLIC BLOOD PRESSURE: 100 MMHG | WEIGHT: 196 LBS

## 2018-01-16 ENCOUNTER — APPOINTMENT (OUTPATIENT)
Dept: URGENT CARE | Facility: CLINIC | Age: 48
End: 2018-01-16
Payer: OTHER MISCELLANEOUS

## 2018-01-16 PROCEDURE — 99213 OFFICE O/P EST LOW 20 MIN: CPT

## 2018-01-18 NOTE — MISCELLANEOUS
Message  patient is here for workmens comp please see systoc  Active Problems    1  Epicondylitis, lateral (726 32) (M77 10)   2  Pain in elbow joint (719 42) (M23 529)    Current Meds   1  [facility] CAPS Recorded   2  Meloxicam 15 MG Oral Tablet; TAKE 1 TABLET DAILY AS NEEDED FOR PAIN;   Therapy: [id number] to (Evaluate:29Oct2014)  Requested for: 22MBJ6835; Last   Rx:[id number] Ordered   3  Multiple Vitamins Oral Tablet Recorded    Allergies    1   No Known Drug Allergies    Signatures   Electronically signed by : VALERY Martinez ; Sep  2 2016 12:52PM EST                       (Author)

## 2018-01-22 VITALS
BODY MASS INDEX: 28.77 KG/M2 | DIASTOLIC BLOOD PRESSURE: 90 MMHG | HEART RATE: 86 BPM | HEIGHT: 70 IN | SYSTOLIC BLOOD PRESSURE: 148 MMHG | WEIGHT: 201 LBS

## 2018-01-23 NOTE — PROGRESS NOTES
Assessment    1  Right wrist pain (719 43) (M25 531)    Plan  Pain in elbow joint    · Meloxicam 15 MG Oral Tablet  Right wrist pain    · * MRI WRIST RIGHT WO CONTRAST; Status:Need Information - Financial Authorization; Requested for:[id number];    · * XR WRIST 3+ VIEW RIGHT; Status:Active; Requested for:52Zrv3000;    · Wrist splint; Status:Complete;   Done: 01PBQ1296   · Follow Up After Tests Complete Evaluation and Treatment  Follow-up  Status: Complete   Done: 23MQU4374    Discussion/Summary    Patient was seen and examined by Dr Rupali Guevara and myself  Findings consistent with right wrist pain, possible injury to the TFCC  Findings and treatment options were discussed with the patient  Recommend cockup wrist brace for protection and support and an MRI of the right wrist  Continue work restrictions  Ice, elevation and NSAIDs as needed  Follow-up with MRI results  Chief Complaint  Right wrist injury      History of Present Illness  HPI: This is a 18-year-old white male who suffered a work-related injury to his right wrist on August 24, 2016  Patient states he was lifting a 45 pound pail off the back of his truck, and when he came down with that his right wrist suddenly twisted with a weight of the pail  He had sudden sharp pain in his right wrist with swelling  Since then he has had difficulty lifting any objects with that hand and continues to have sharp pain over the ulna sided aspect of his wrist  He was seen by occupational medicine and referred to orthopedics  He denies any prior injury to that wrist       Review of Systems    Constitutional: No fever or chills, feels well, no tiredness, no recent weight loss or weight gain  Eyes: No complaints of red eyes, no eyesight problems  ENT: no complaints of loss of hearing, no nosebleeds, no sore throat  Cardiovascular: No complaints of chest pain, no palpitations, no leg claudication or lower extremity edema     Respiratory: No complaints of shortness of breath, no wheezing, no cough  Gastrointestinal: No complaints of abdominal pain, no constipation, no nausea or vomiting, no diarrhea or bloody stools  Genitourinary: No complaints of dysuria or incontinence, no hesitancy, no nocturia  Musculoskeletal: as noted in HPI  Integumentary: No complaints of skin rash or lesion, no itching or dry skin, no skin wounds  Neurological: No complaints of headache, no confusion, no numbness or tingling, no dizziness  Psychiatric: No suicidal thoughts, no anxiety, no depression  Endocrine: No muscle weakness, no frequent urination, no excessive thirst, no feelings of weakness  ROS reviewed  Active Problems    1  Epicondylitis, lateral (726 32) (M77 10)   2  Pain in elbow joint (719 42) (M25 529)   3  Right wrist pain (719 43) (M25 531)    Past Medical History    The active problems and past medical history were reviewed and updated today  Surgical History    The surgical history was reviewed and updated today  Family History  Mother    · Maternal history of Healthy adult  Father    · Paternal history of     The family history was reviewed and updated today  Social History    · Current every day smoker (305 1) (F17 200)  The social history was reviewed and updated today  Current Meds   1  [employer] Fish [employer] CAPS Recorded   2  Meloxicam 15 MG Oral Tablet; TAKE 1 TABLET DAILY AS NEEDED FOR PAIN;   Therapy: [id number] to (Evaluate:2014)  Requested for: 55MZS6349; Last   Rx:[id number] Ordered   3  Multiple Vitamins Oral Tablet Recorded    The medication list was reviewed and updated today  Allergies    1   No Known Drug Allergies    Vitals  Signs    Systolic: 557  Diastolic: 89  Heart Rate: 84  Height: 5 ft 10 in  Weight: 183 lb   BMI Calculated: 26 26  BSA Calculated: 2 01    Physical Exam    Constitutional - General appearance: Normal    Neurologic - Sensation: Normal  Upper extremity peripheral neuro exam: Normal    Psychiatric - Orientation to person, place, and time: Normal  Mood and affect: Normal    Eyes   Conjunctiva and lids: Normal     Right Wrist: Appearance: swelling (posterior )  Tenderness: TFCC, but not the radial styloid  Palpatory findings include no crepitus  Extension: painful restricted AROM  Ulnar deviation: painful restricted AROM  Special Tests: positive ulnar grind for TFCC pathology  Results/Data  I personally reviewed the films/images/results in the office today  My interpretation follows  X-ray Review 3 views of the right wrist reveal no abnormalities  Attending Note  Attending Note [facility]: Patient's History: Twisting injury while hold a 40 lbs object at work  Immediate pain over his right wrist  Pain mostly over the outer area  Key Parts of the Exam: Swelling over the TFCC  Tender with palpation over DRUJ and TFCC  Pain with ulnar deviation, supination and pronation  X-rays no fracture  Joint with good alignment  Diagnosis and Plan: Right wrist sprain with possible TFCC injury  Cock-up brace  Work limitation  Check MRI        Signatures   Electronically signed by : Alyssa Wade, [facility]; Sep 15 2016 10:39AM EST                       (Author)    Electronically signed by : Josy Sanchez MD; Sep 15 2016 11:56AM EST                       (Author)

## 2018-02-12 ENCOUNTER — OFFICE VISIT (OUTPATIENT)
Dept: OBGYN CLINIC | Facility: CLINIC | Age: 48
End: 2018-02-12
Payer: OTHER MISCELLANEOUS

## 2018-02-12 VITALS
DIASTOLIC BLOOD PRESSURE: 107 MMHG | SYSTOLIC BLOOD PRESSURE: 153 MMHG | BODY MASS INDEX: 29.89 KG/M2 | HEIGHT: 70 IN | WEIGHT: 208.8 LBS | HEART RATE: 98 BPM

## 2018-02-12 DIAGNOSIS — M24.131 ARTICULAR CARTILAGE DISORDER OF RIGHT WRIST: Primary | ICD-10-CM

## 2018-02-12 PROCEDURE — 99214 OFFICE O/P EST MOD 30 MIN: CPT | Performed by: ORTHOPAEDIC SURGERY

## 2018-02-12 NOTE — LETTER
February 12, 2018     Patient: Tracy Salcido   YOB: 1970   Date of Visit: 2/12/2018       To Whom it May Concern:    Josias Johansen is under my professional care  He was seen in my office on 2/12/2018  He should not use right wrist until cleared  If you have any questions or concerns, please don't hesitate to call           Sincerely,          Dane Parson MD        CC: No Recipients

## 2018-02-12 NOTE — PROGRESS NOTES
ASSESSMENT/PLAN:    Diagnoses and all orders for this visit:    Articular cartilage disorder of right wrist  -     MRI wrist right wo contrast; Future    Other orders  -     diclofenac sodium (VOLTAREN) 1 %; Place on the skin        Assessment:   ECU tendonitis     Plan:   MRI w/o contrast to r/o re tear     Follow Up: After Testing   No use of Right wrist until cleared     To Do Next Visit:           _____________________________________________________  CHIEF COMPLAINT:  Chief Complaint   Patient presents with    Right Wrist - Follow-up, Pain         SUBJECTIVE:  Dutch Olivas is a 50y o  year old male who presents for follow up regarding TFCC Tear  right  Since last visit, Marcelle Tavares on Mikey 10, 2018, the patient was picking up a box weighing 10 lbs and had pain located over ulnar aspect of wrist  He was given and brace by his job  Today there is Pain  Moderate  Intermittant  Sharp and Numbness to the right small finger  Radiation: None  Associated symptoms: None    PAST MEDICAL HISTORY:  Past Medical History:   Diagnosis Date    Articular cartilage disorder of right wrist     Bronchitis     recent    Tobacco abuse        PAST SURGICAL HISTORY:  Past Surgical History:   Procedure Laterality Date    ARTHROSCOPY WRIST Right 1/24/2017    Procedure: ARTHROSCOPY WRIST WITH TFCC REPAIR; PARTIAL SYNOVECTOMY; SPLINT APPLICATION;  Surgeon: Clyde Martin MD;  [facility];  Service:     COLONOSCOPY         FAMILY HISTORY:  Family History   Problem Relation Age of Onset    Cancer Father        SOCIAL HISTORY:  Social History   Substance Use Topics    Smoking status: Current Every Day Smoker     Packs/day: 1 00     Years: 20 00     Types: Cigarettes    Smokeless tobacco: Never Used      Comment: 1ppd X 30 yrs      Alcohol use 10 8 oz/week     18 Cans of beer per week       MEDICATIONS:    Current Outpatient Prescriptions:     MULTIPLE VITAMINS PO, Take by mouth daily  , Disp: , Rfl:    Omega-3 Fatty Acids ([employer] FISH OIL PO), Take by mouth daily  , Disp: , Rfl:     diclofenac sodium (VOLTAREN) 1 %, Place on the skin, Disp: , Rfl:     ALLERGIES:  No Known Allergies    REVIEW OF SYSTEMS:  Pertinent items are noted in HPI  A comprehensive review of systems was negative  LABS:  HgA1c: No results found for: HGBA1C  BMP:   Lab Results   Component Value Date    GLUCOSE 117 12/07/2016    CALCIUM 8 5 12/07/2016     12/07/2016    K 3 8 12/07/2016    CO2 26 12/07/2016     12/07/2016    BUN 13 12/07/2016    CREATININE 0 82 12/07/2016           _____________________________________________________  PHYSICAL EXAMINATION:  General: well developed and well nourished, alert, oriented times 3 and appears comfortable  Psychiatric: Normal  HEENT: Trachea Midline, No torticollis  Cardiovascular: No discernable arrhythmia  Pulmonary: No wheezing or stridor  Skin: No masses, erthema, lacerations, fluctation, ulcerations  Neurovascular: Sensation Intact to the Median, Ulnar, Radial Nerve, Motor Intact to the Median, Ulnar, Radial Nerve and Pulses Intact    MUSCULOSKELETAL EXAMINATION:    TTP over triquetral hamate, TTP over SL  Non TTP over UT  Positive synergy test  Mild TTP over ECU, no subluxation  _____________________________________________________  STUDIES REVIEWED:  No Studies to review      PROCEDURES PERFORMED:  Procedures  No Procedures performed today     I agree with the history, examination, assessment and plan of the patient as documented above

## 2018-02-26 ENCOUNTER — OFFICE VISIT (OUTPATIENT)
Dept: OBGYN CLINIC | Facility: CLINIC | Age: 48
End: 2018-02-26
Payer: OTHER MISCELLANEOUS

## 2018-02-26 VITALS
DIASTOLIC BLOOD PRESSURE: 106 MMHG | HEIGHT: 70 IN | HEART RATE: 101 BPM | BODY MASS INDEX: 29.92 KG/M2 | WEIGHT: 209 LBS | SYSTOLIC BLOOD PRESSURE: 148 MMHG

## 2018-02-26 DIAGNOSIS — T14.8XXA CONTUSION OF BONE: ICD-10-CM

## 2018-02-26 DIAGNOSIS — M77.9 TENDONITIS: Primary | ICD-10-CM

## 2018-02-26 PROCEDURE — 99213 OFFICE O/P EST LOW 20 MIN: CPT | Performed by: ORTHOPAEDIC SURGERY

## 2018-02-26 NOTE — PROGRESS NOTES
ASSESSMENT/PLAN:    Diagnoses and all orders for this visit:    Tendonitis    Contusion of bone        Assessment:   Lunate contusion and ECU tendonitis     Plan:   Discussion was had with the patient regarding ECU tendinitis and bone contusion of the lunate  The tendinitis will take time to improve the patient was offered a steroid injection to help with inflammation as well as pain control however the patient did politely declined  We did recommend oral anti-inflammatories brace and as needed however patient has no restrictions  This was documented  [employer] Resume activities as tolerated, activity modification and bracing  A work note was given today    Follow Up:  PRN    To Do Next Visit:       General Discussions:     Conservative Treatment ECU tendonitis and Contusion Lunate     Operative Discussions:         _____________________________________________________  CHIEF COMPLAINT:  Chief Complaint   Patient presents with    Right Wrist - Follow-up         SUBJECTIVE:  Shoshana Lombard is a 50y o  year old male who presents for follow up regarding Right wrist pain and concern for re-tear TFCC  Since last visit, Shoshana Lombard has Continue to report pain and was sent for MRI  Today patient presents with his MRI and is here today to go over his pre-  He denies any numbness or tingling  He does report pain with twisting motions of his wrist otherwise no new complaints    Radiation: Small finger occasionally  Associated symptoms: None    PAST MEDICAL HISTORY:  Past Medical History:   Diagnosis Date    Articular cartilage disorder of right wrist     Bronchitis     recent    Tobacco abuse        PAST SURGICAL HISTORY:  Past Surgical History:   Procedure Laterality Date    ARTHROSCOPY WRIST Right 1/24/2017    Procedure: ARTHROSCOPY WRIST WITH TFCC REPAIR; PARTIAL SYNOVECTOMY; SPLINT APPLICATION;  Surgeon: Danelle Dior MD;  [facility];  Service:     COLONOSCOPY         FAMILY HISTORY:  Family History   Problem Relation Age of Onset    Cancer Father        SOCIAL HISTORY:  Social History   Substance Use Topics    Smoking status: Current Every Day Smoker     Packs/day: 1 00     Years: 20 00     Types: Cigarettes    Smokeless tobacco: Never Used      Comment: 1ppd X 30 yrs   Alcohol use 10 8 oz/week     18 Cans of beer per week       MEDICATIONS:    Current Outpatient Prescriptions:     diclofenac sodium (VOLTAREN) 1 %, Place on the skin, Disp: , Rfl:     MULTIPLE VITAMINS PO, Take by mouth daily  , Disp: , Rfl:     Omega-3 Fatty Acids ([employer] FISH OIL PO), Take by mouth daily  , Disp: , Rfl:     ALLERGIES:  No Known Allergies    REVIEW OF SYSTEMS:  Pertinent items are noted in HPI  A comprehensive review of systems was negative  LABS:  HgA1c: No results found for: HGBA1C  BMP:   Lab Results   Component Value Date    GLUCOSE 117 12/07/2016    CALCIUM 8 5 12/07/2016     12/07/2016    K 3 8 12/07/2016    CO2 26 12/07/2016     12/07/2016    BUN 13 12/07/2016    CREATININE 0 82 12/07/2016           _____________________________________________________  PHYSICAL EXAMINATION:  General: well developed and well nourished, alert, oriented times 3 and appears comfortable  Psychiatric: Normal  HEENT: Trachea Midline, No torticollis  Cardiovascular: No discernable arrhythmia  Pulmonary: No wheezing or stridor  Skin: No masses, erthema, lacerations, fluctation, ulcerations  Neurovascular: Sensation Intact to the Median, Ulnar, Radial Nerve, Motor Intact to the Median, Ulnar, Radial Nerve and Pulses Intact    MUSCULOSKELETAL EXAMINATION:  RIGHT SIDE:  Wrist:  No Instability, Normal Sood, ROM Full, pain with ulnar deviation, there is a clicking that is appreciated Needed ECU and Positive ECU Subluxation   Mild tenderness over the lunate    _____________________________________________________  STUDIES REVIEWED:  I have personally reviewed pertinent films in PACS and my interpretation is MRI right wrist demonstrates patient to have edema within the lunate  There is no evidence of re-tear within the TFCC patient does have inflammation around the ECU tendon demonstrated ECU tendinitis and edema bone contusion within the lunate  PROCEDURES PERFORMED:  Procedures  No Procedures performed today   I interviewed, took the history and examined the patient  I discuss the case with the resident and reviewed the resident's note  I supervised the resident and I agree with the resident management plan as it was presented to me  I was present in the clinic and examined the patient

## 2018-09-14 ENCOUNTER — OFFICE VISIT (OUTPATIENT)
Dept: URGENT CARE | Facility: CLINIC | Age: 48
End: 2018-09-14
Payer: COMMERCIAL

## 2018-09-14 VITALS
RESPIRATION RATE: 16 BRPM | TEMPERATURE: 98.4 F | HEIGHT: 70 IN | OXYGEN SATURATION: 96 % | BODY MASS INDEX: 28.63 KG/M2 | HEART RATE: 84 BPM | SYSTOLIC BLOOD PRESSURE: 148 MMHG | DIASTOLIC BLOOD PRESSURE: 82 MMHG | WEIGHT: 200 LBS

## 2018-09-14 DIAGNOSIS — S60.562A INSECT BITE HAND, LEFT, INITIAL ENCOUNTER: Primary | ICD-10-CM
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
## 2018-09-14 PROCEDURE — 99213 OFFICE O/P EST LOW 20 MIN: CPT | Performed by: PHYSICIAN ASSISTANT

## 2018-09-14 RX ORDER — METHYLPREDNISOLONE SODIUM SUCCINATE 125 MG/2ML
125 INJECTION, POWDER, LYOPHILIZED, FOR SOLUTION INTRAMUSCULAR; INTRAVENOUS ONCE
Status: COMPLETED | OUTPATIENT
Start: 2018-09-14 | End: 2018-09-14

## 2018-09-14 RX ADMIN — METHYLPREDNISOLONE SODIUM SUCCINATE 125 MG: 125 INJECTION, POWDER, LYOPHILIZED, FOR SOLUTION INTRAMUSCULAR; INTRAVENOUS at 08:20

## 2018-09-14 NOTE — PATIENT INSTRUCTIONS
Solumedrol given in office  Keep the arm elevated, apply ice frequently, continue benadryl tonight when you are home  Watch for signs of infection- worsening pain, drainage from the wound, streaking  Follow up with your PCP for persistent symptoms  Go to the ER for any distress

## 2018-09-14 NOTE — PROGRESS NOTES
[facility]        NAME: Marie Tinsley is a 50 y o  male  : 1970    MRN: [id number]  DATE: 2018  TIME: 8:27 AM    Assessment and Plan   Insect bite hand, left, initial encounter [S60 562A, W57  XXXA]  1  Insect bite hand, left, initial encounter  methylPREDNISolone sodium succinate (Solu-MEDROL) injection 125 mg         Patient Instructions     Solumedrol given in office  Keep the arm elevated, apply ice frequently, continue benadryl tonight when you are home  Watch for signs of infection- worsening pain, drainage from the wound, streaking  Follow up with PCP in 3-5 days  Proceed to  ER if symptoms worsen  Chief Complaint     Chief Complaint   Patient presents with    Insect Bite     left wrist  Yesterday he was stung by a bee  He has swelling from his elbow into his fingers  History of Present Illness       This is a 50year old male presenting for insect bite to the left arm x 21 hours ago  He states that he was stung by a bee yesterday and has had progressively increasing swelling since the bite  The area is pruritic and swollen, with pain due to swelling  He is right handed  He was icing and using benadryl without relief  No shortness of breath, wheezing, abdominal pain, nausea, vomiting, dizziness  Review of Systems   Review of Systems   Constitutional: Negative for chills and fever  Respiratory: Negative for shortness of breath  Gastrointestinal: Negative for abdominal pain, nausea and vomiting  Musculoskeletal: Positive for joint swelling  Neurological: Negative for dizziness and headaches  Current Medications       Current Outpatient Prescriptions:     diclofenac sodium (VOLTAREN) 1 %, Place on the skin, Disp: , Rfl:     MULTIPLE VITAMINS PO, Take by mouth daily  , Disp: , Rfl:     Omega-3 Fatty Acids ([employer] FISH OIL PO), Take by mouth daily  , Disp: , Rfl:   No current facility-administered medications for this visit       Current Allergies     Allergies as of 09/14/2018    (No Known Allergies)            The following portions of the patient's history were reviewed and updated as appropriate: allergies, current medications, past family history, past medical history, past social history, past surgical history and problem list      Past Medical History:   Diagnosis Date    Articular cartilage disorder of right wrist     Bronchitis     recent    Tobacco abuse        Past Surgical History:   Procedure Laterality Date    ARTHROSCOPY WRIST Right 1/24/2017    Procedure: ARTHROSCOPY WRIST WITH TFCC REPAIR; PARTIAL SYNOVECTOMY; SPLINT APPLICATION;  Surgeon: Sergio Cunha MD;  [facility];  Service:     COLONOSCOPY         Family History   Problem Relation Age of Onset    Cancer Father          Medications have been verified  Objective   /82   Pulse 84   Temp 98 4 °F (36 9 °C)   Resp 16   Ht 5' 10" (1 778 m)   Wt 90 7 kg (200 lb)   SpO2 96%   BMI 28 70 kg/m²        Physical Exam     Physical Exam   Constitutional: He appears well-developed and well-nourished  No distress  HENT:   Head: Normocephalic and atraumatic  Nose: Nose normal    Eyes: Conjunctivae and EOM are normal  Pupils are equal, round, and reactive to light  Cardiovascular: Normal rate, regular rhythm and normal heart sounds  Pulmonary/Chest: Effort normal and breath sounds normal  No respiratory distress  He has no wheezes  He has no rales  Neurological: He is alert  Skin: Skin is warm and dry  He is not diaphoretic  Left arm: There is mild erythema, no ecchymosis or wounds  There is a small opening in the skin where the bite may have occurred  The entire hand and lower arm up to the elbow are moderately swollen  FROM of the wrist and hand, sensation intact, 3+ radial pulses, distal cap refill less than 2 seconds  No cellulitis, drainage, streaking  + warmth to the touch  Nursing note and vitals reviewed

## 2019-09-09 ENCOUNTER — OFFICE VISIT (OUTPATIENT)
Dept: URGENT CARE | Facility: CLINIC | Age: 49
End: 2019-09-09
Payer: COMMERCIAL

## 2019-09-09 ENCOUNTER — APPOINTMENT (OUTPATIENT)
Dept: RADIOLOGY | Facility: CLINIC | Age: 49
End: 2019-09-09
Payer: COMMERCIAL

## 2019-09-09 VITALS
OXYGEN SATURATION: 96 % | HEIGHT: 70 IN | HEART RATE: 120 BPM | SYSTOLIC BLOOD PRESSURE: 150 MMHG | BODY MASS INDEX: 29.12 KG/M2 | TEMPERATURE: 98.1 F | DIASTOLIC BLOOD PRESSURE: 100 MMHG | RESPIRATION RATE: 16 BRPM | WEIGHT: 203.4 LBS

## 2019-09-09 DIAGNOSIS — M25.511 ACUTE PAIN OF RIGHT SHOULDER: Primary | ICD-10-CM

## 2019-09-09 DIAGNOSIS — I10 HYPERTENSION, UNSPECIFIED TYPE: ICD-10-CM

## 2019-09-09 DIAGNOSIS — M77.8 TENDONITIS OF SHOULDER, RIGHT: ICD-10-CM

## 2019-09-09 DIAGNOSIS — M25.511 ACUTE PAIN OF RIGHT SHOULDER: ICD-10-CM

## 2019-09-09 PROCEDURE — 73030 X-RAY EXAM OF SHOULDER: CPT

## 2019-09-09 PROCEDURE — G0382 LEV 3 HOSP TYPE B ED VISIT: HCPCS | Performed by: EMERGENCY MEDICINE

## 2019-09-09 RX ORDER — NAPROXEN 500 MG/1
500 TABLET ORAL
Qty: 20 TABLET | Refills: 0 | Status: SHIPPED | OUTPATIENT
Start: 2019-09-09 | End: 2020-09-08

## 2019-09-09 RX ORDER — LISINOPRIL 10 MG/1
10 TABLET ORAL DAILY
Qty: 30 TABLET | Refills: 0 | Status: SHIPPED | OUTPATIENT
Start: 2019-09-09

## 2019-09-09 NOTE — PROGRESS NOTES
Assessment/Plan:    No problem-specific Assessment & Plan notes found for this encounter  Diagnoses and all orders for this visit:    Acute pain of right shoulder  -     XR shoulder 2+ vw right; Future          Subjective:      Patient ID: Zaheer Leroy is a 46 y o  male  Pt c/o shoulder pain for the last few weeks; history of wrist surgery for tendon injury on R; does a lot of lifting at wrok    Arm Pain    The incident occurred more than 1 week ago  The incident occurred at work  Injury mechanism: frequent lifting  The pain is present in the right shoulder  The quality of the pain is described as aching  The pain radiates to the right arm  The pain is at a severity of 3/10  The pain is mild  The pain has been fluctuating since the incident  The symptoms are aggravated by lifting  The treatment provided no relief  The following portions of the patient's history were reviewed and updated as appropriate: allergies, current medications, past family history, past medical history, past social history, past surgical history and problem list     Review of Systems   Musculoskeletal: Positive for arthralgias (R shoulder)  All other systems reviewed and are negative  Objective:      Temp 98 1 °F (36 7 °C)   Resp 16   Ht 5' 10" (1 778 m)   Wt 92 3 kg (203 lb 6 4 oz)   SpO2 96%   BMI 29 18 kg/m²          Physical Exam   Constitutional: He is oriented to person, place, and time  He appears well-developed and well-nourished  HENT:   Nose: Nose normal    Eyes: Pupils are equal, round, and reactive to light  Neck: Normal range of motion  Cardiovascular: Normal rate  Pulmonary/Chest: Effort normal    Abdominal: Soft  Musculoskeletal:        Right shoulder: He exhibits tenderness, bony tenderness and pain (with movement)  He exhibits normal range of motion  Neurological: He is alert and oriented to person, place, and time  Skin: Skin is warm and dry     Psychiatric: He has a normal mood and affect  His behavior is normal  Judgment and thought content normal    Nursing note and vitals reviewed

## 2019-09-09 NOTE — LETTER
September 9, 2019     Patient: Cyndie Graves   YOB: 1970   Date of Visit: 9/9/2019       To Whom It May Concern: It is my medical opinion that Liliana Ugarte may return to light duty immediately with the following restrictions: No lifting over 20 lbs until cleared by orthopedic surgeon  If you have any questions or concerns, please don't hesitate to call  Sincerely,        Avi Mora MD    CC: Dutch Henning

## 2019-09-09 NOTE — PATIENT INSTRUCTIONS
Naprosyn 3 x a day for shoulder pain, Lisinopril once a day for BP, see Dr Mag Ellis for follow-up and see Dr Roopa Yo for BP

## 2019-09-17 ENCOUNTER — OFFICE VISIT (OUTPATIENT)
Dept: OBGYN CLINIC | Facility: CLINIC | Age: 49
End: 2019-09-17
Payer: COMMERCIAL

## 2019-09-17 VITALS
SYSTOLIC BLOOD PRESSURE: 152 MMHG | BODY MASS INDEX: 29.63 KG/M2 | HEIGHT: 70 IN | DIASTOLIC BLOOD PRESSURE: 90 MMHG | WEIGHT: 207 LBS | HEART RATE: 84 BPM

## 2019-09-17 DIAGNOSIS — M25.511 ACUTE PAIN OF RIGHT SHOULDER: ICD-10-CM

## 2019-09-17 DIAGNOSIS — M77.8 TENDINITIS OF RIGHT SHOULDER: ICD-10-CM

## 2019-09-17 PROCEDURE — 99213 OFFICE O/P EST LOW 20 MIN: CPT | Performed by: ORTHOPAEDIC SURGERY

## 2019-09-17 PROCEDURE — 20610 DRAIN/INJ JOINT/BURSA W/O US: CPT | Performed by: ORTHOPAEDIC SURGERY

## 2019-09-17 RX ORDER — LIDOCAINE HYDROCHLORIDE 10 MG/ML
7 INJECTION, SOLUTION EPIDURAL; INFILTRATION; INTRACAUDAL; PERINEURAL
Status: COMPLETED | OUTPATIENT
Start: 2019-09-17 | End: 2019-09-17

## 2019-09-17 RX ORDER — BETAMETHASONE SODIUM PHOSPHATE AND BETAMETHASONE ACETATE 3; 3 MG/ML; MG/ML
6 INJECTION, SUSPENSION INTRA-ARTICULAR; INTRALESIONAL; INTRAMUSCULAR; SOFT TISSUE
Status: COMPLETED | OUTPATIENT
Start: 2019-09-17 | End: 2019-09-17

## 2019-09-17 RX ADMIN — LIDOCAINE HYDROCHLORIDE 7 ML: 10 INJECTION, SOLUTION EPIDURAL; INFILTRATION; INTRACAUDAL; PERINEURAL at 08:48

## 2019-09-17 RX ADMIN — BETAMETHASONE SODIUM PHOSPHATE AND BETAMETHASONE ACETATE 6 MG: 3; 3 INJECTION, SUSPENSION INTRA-ARTICULAR; INTRALESIONAL; INTRAMUSCULAR; SOFT TISSUE at 08:48

## 2019-09-17 NOTE — ASSESSMENT & PLAN NOTE
Findings consistent with right shoulder tendinitis  Discussed findings and treatment options with the patient  I reviewed patient's right shoulder x-ray with him  I provided patient cortisone injection in the right shoulder subacromial space, which patient tolerated well with good pain relief  I will refer patient to physical therapy for shoulder rehabilitation  We will limit patient's work activities in lifting  Cold compress over the right shoulder today  Take over-the-counter NSAID for the pain  I will see patient back in 6 weeks for re-evaluation  All patient's questions were answered to his satisfaction  This note is created using dictation transcription  It may contain typographical errors, grammatical errors, improperly dictated words, background noise and other errors

## 2019-09-17 NOTE — LETTER
September 17, 2019     Patient: Yaritza Ashley   YOB: 1970   Date of Visit: 9/17/2019       To Whom it May Concern:    [place] is under my professional care  He was seen in my office on 9/17/2019  He may return to work with limitations for 6 weeks  No above shoulder lifting with right arm  If you have any questions or concerns, please don't hesitate to call           Sincerely,          Susan Hightower MD        CC: No Recipients

## 2019-09-17 NOTE — PROGRESS NOTES
Assessment:     1  Tendinitis of right shoulder    2  Acute pain of right shoulder        Plan:     Problem List Items Addressed This Visit        Musculoskeletal and Integument    Tendinitis of right shoulder     Findings consistent with right shoulder tendinitis  Discussed findings and treatment options with the patient  I reviewed patient's right shoulder x-ray with him  I provided patient cortisone injection in the right shoulder subacromial space, which patient tolerated well with good pain relief  I will refer patient to physical therapy for shoulder rehabilitation  We will limit patient's work activities in lifting  Cold compress over the right shoulder today  Take over-the-counter NSAID for the pain  I will see patient back in 6 weeks for re-evaluation  All patient's questions were answered to his satisfaction  This note is created using dictation transcription  It may contain typographical errors, grammatical errors, improperly dictated words, background noise and other errors  Relevant Orders    Ambulatory referral to Physical Therapy    Large joint arthrocentesis      Other Visit Diagnoses     Acute pain of right shoulder             Subjective:     Patient ID: Zaheer Leroy is a 46 y o  male  Chief Complaint:  64-year-old male with gradual onset of right shoulder pain started a few weeks ago  Patient denies any injury  He had history of right wrist surgery in the past and continued to have pain in the wrist   He has limitation with his working lifting no more than 20 lb  He has been frequently lifting and he think that has cost his right shoulder pain to developed  He is complaining of pain in lifting, carrying, and overhead activities  He denies pain in his neck  He has not have any treatment  He denied numbness or weakness in the arm  Information on patient's intake form was reviewed      Allergy:  No Known Allergies  Medications:  all current active meds have been reviewed  Past Medical History:  Past Medical History:   Diagnosis Date    Articular cartilage disorder of right wrist     Bronchitis     recent    Tobacco abuse      Past Surgical History:  Past Surgical History:   Procedure Laterality Date    ARTHROSCOPY WRIST Right 1/24/2017    Procedure: ARTHROSCOPY WRIST WITH TFCC REPAIR; PARTIAL SYNOVECTOMY; SPLINT APPLICATION;  Surgeon: Meagan Walsh MD;  Location: [facility];  Service:     COLONOSCOPY      WRIST SURGERY Right 01/2017     Family History:  Family History   Problem Relation Age of Onset    Cancer Father      Social History:  Social History     Substance and Sexual Activity   Alcohol Use Yes    Alcohol/week: 18 0 standard drinks    Types: 18 Cans of beer per week     Social History     Substance and Sexual Activity   Drug Use No     Social History     Tobacco Use   Smoking Status Current Every Day Smoker    Packs/day: 1 00    Years: 20 00    Pack years: 20 00    Types: Cigarettes   Smokeless Tobacco Never Used   Tobacco Comment    1ppd X 30 yrs  Review of Systems   Constitutional: Negative  HENT: Negative  Eyes: Negative  Respiratory: Negative  Cardiovascular: Negative  Gastrointestinal: Negative  Endocrine: Negative  Genitourinary: Negative  Musculoskeletal: Positive for arthralgias (Right shoulder and wrist)  Negative for joint swelling and neck pain  Skin: Negative  Neurological: Negative  Hematological: Negative  Psychiatric/Behavioral: Negative  Objective:  BP Readings from Last 1 Encounters:   09/17/19 152/90      Wt Readings from Last 1 Encounters:   09/17/19 93 9 kg (207 lb)      BMI:   Estimated body mass index is 29 7 kg/m² as calculated from the following:    Height as of this encounter: 5' 10" (1 778 m)  Weight as of this encounter: 93 9 kg (207 lb)    BSA:   Estimated body surface area is 2 12 meters squared as calculated from the following:    Height as of this encounter: 5' 10" (1 778 m)  Weight as of this encounter: 93 9 kg (207 lb)  Physical Exam   Constitutional: He is oriented to person, place, and time  He appears well-developed  HENT:   Head: Normocephalic and atraumatic  Eyes: Conjunctivae and EOM are normal    Neck: Neck supple  Pulmonary/Chest: Effort normal    Neurological: He is alert and oriented to person, place, and time  Skin: Skin is warm  Psychiatric: He has a normal mood and affect  Nursing note and vitals reviewed  Right Shoulder Exam     Tenderness   The patient is experiencing tenderness in the acromioclavicular joint (Anteriorly and laterally)  Range of Motion   The patient has normal right shoulder ROM  Right shoulder active abduction: Pain  Right shoulder forward flexion: Pain  Muscle Strength   The patient has normal right shoulder strength (Pain with resistant abduction and external rotation)  Tests   Apprehension: negative  Mccarthy test: positive  Cross arm: positive  Impingement: positive  Drop arm: negative  Sulcus: absent    Other   Erythema: absent  Sensation: normal  Pulse: present    Comments:  Negative Speed test            I have personally reviewed pertinent films in PACS and my interpretation is Right shoulder x-ray show good joint alignment  Type 2 acromion process  No soft tissue calcification  Mild acromioclavicular joint osteoarthritis       Large joint arthrocentesis  Date/Time: 9/17/2019 8:48 AM  Consent given by: patient  Site marked: site marked  Timeout: Immediately prior to procedure a time out was called to verify the correct patient, procedure, equipment, support staff and site/side marked as required   Supporting Documentation  Indications: pain   Procedure Details  Location: shoulder -   Preparation: Patient was prepped and draped in the usual sterile fashion  Needle size: 22 G  Ultrasound guidance: no  Approach: posterior  Medications administered: 7 mL lidocaine (PF) 1 %; 6 mg betamethasone acetate-betamethasone sodium phosphate 6 (3-3) mg/mL    Patient tolerance: patient tolerated the procedure well with no immediate complications  Dressing:  Sterile dressing applied

## 2019-09-17 NOTE — LETTER
September 17, 2019     Patient: Cyndie Graves   YOB: 1970   Date of Visit: 9/17/2019       To Whom it May Concern:    Liliana Torito is under my professional care  He was seen in my office on 9/17/2019  He may return to work with limitations for 6 weeks  No lifting > 20 lbs and no above shoulder use with right arm  If you have any questions or concerns, please don't hesitate to call           Sincerely,          Liliana Phipps MD        CC: No Recipients

## 2019-09-20 ENCOUNTER — EVALUATION (OUTPATIENT)
Dept: PHYSICAL THERAPY | Facility: CLINIC | Age: 49
End: 2019-09-20
Payer: COMMERCIAL

## 2019-09-20 DIAGNOSIS — M54.10 RADICULOPATHY OF ARM: ICD-10-CM

## 2019-09-20 DIAGNOSIS — M77.8 TENDINITIS OF RIGHT SHOULDER: Primary | ICD-10-CM

## 2019-09-20 PROCEDURE — 97162 PT EVAL MOD COMPLEX 30 MIN: CPT | Performed by: PHYSICAL THERAPIST

## 2019-09-20 NOTE — PROGRESS NOTES
PT Evaluation     Today's date: 2019  Patient name: Triston Tinoco  : 1970  MRN: [id number]  Referring provider: Ismael Truong MD  Dx:   Encounter Diagnosis     ICD-10-CM    1  Tendinitis of right shoulder M75 81 Ambulatory referral to Physical Therapy   2  Radiculopathy of arm M54 10                   Assessment  Assessment details: Triston Tinoco is a 46 y o  male presenting as an outpatient to [facility] PT w/ c/o R shoulder pain w/ intermittent lateral RUE radicular symptoms  In addition to pain, pt presents w/ impaired posture, s/s of 1st rib impingement syndrome, hypertonicity of surrounding musculature, decreased ROM, and decreased strength significantly limiting pt's functional ability  Pt will benefit from skilled PT services to address the above deficits in order to max function to allow pt to achieve goals in PT  Thank you for the referral of this pt  Impairments: abnormal muscle tone, abnormal or restricted ROM, activity intolerance, impaired physical strength, lacks appropriate home exercise program, pain with function and poor posture   Barriers to therapy: High co-pay may limit frequency of tx  Understanding of Dx/Px/POC: good   Prognosis: good    Goals  ST  Pain decreased by 25% in 4-6 weeks  2  ROM increased by 25% in 4-6 weeks  3  Strength increased by 1/2 to 1 muscle grade in all deficient muscle groups in 4-6 weeks  LT  Decrease pain to 1-2/10 at worst by d/c   2  Increase ROM to [facility] for all deficient movements by d/c   3  Strength increased to 5 for all deficient muscle groups by d/c   4  IADL performance increased to max function by d/c   5  Recreational performance increased to max function by d/c   6  Pt will return to work full duty by d/c      Plan  Planned modality interventions: cryotherapy and TENS  Other planned modality interventions: other modalities PRN  Planned therapy interventions: ADL retraining, IADL retraining, flexibility, functional ROM exercises, graded exercise, home exercise program, manual therapy, joint mobilization, neuromuscular re-education, patient education, postural training, strengthening, therapeutic exercise, therapeutic activities and work reintegration  Other planned therapy interventions: other interventions PRN  Frequency: 1-2x/week  Duration in weeks: 8  Plan of Care beginning date: 2019  Plan of Care expiration date: 11/15/2019  Treatment plan discussed with: patient        Subjective Evaluation    History of Present Illness  Mechanism of injury: Pt reports to IE s/p cortisone injection on 19 w/ c/o R shoulder pain which began approximately 1 month ago of insidious onset w/ progressive worsening  Although he cannot recall any specific incident which may have contributed to symptoms, he reports that he feels it may be related to lifting "differently" over the past couple of years to avoid R hand pain- pt had R hand surgery approximately 2 years ago, but is continuing to have pain  He reports that he is attempting to schedule f/u w/ hand surgeon in the near future  Pt reports intermittent parasthesias along R lateral UE  Pt denies any c/s pain  Pt reports intermittent decreased  strength in RUE  Pt denies any hx of similar pain in R shoulder  Pain  Current pain ratin  At best pain ratin  At worst pain ratin  Location: R shoulder/R lateral UE  Relieving factors: ice  Aggravating factors: sitting (lifting w/ RUE especially from ground, reaching OH, WB'ing through RUE, functional IR, sleeping in R s/l; no difficulty reported w/ functional ER)    Social Support    Employment status: working (Pt works in a warehouse and drives a truck- pt reports job typically requires 60-70# frequently during the day; pt currently on light duty and avoiding heavy lifting)  Hand dominance: right      Diagnostic Tests  Abnormal x-ray: - no acute osseous abnormality; mild OA of AC joint    Patient Goals  Patient goals for therapy: decreased pain  Patient goal: RTW to work full duty        Objective     Cervical/Thoracic Screen   Cervical range of motion within normal limits with the following exceptions: Gross c/s ROM (R/L):   flexion: 54 deg *pain/parasthesias into lat RUE  extension: 51 deg *pain/parasthesias into lat RUE  rotation: 75 deg *pain/parasthesias into lat RUE/76 deg *pain/parasthesias into lat RUE  lateral flexion: 50 deg *pain/parasthesias into lat RUE/40 deg*pain/parasthesias into lat RUE (L lat flex more painful vs  R lat flexion)    Repeated c/s flexion (10 reps): increased R radicular symptoms/pain  Repeated chin tucks (10 reps): increased R radicular symptoms/pain    Active Range of Motion     Right Shoulder   Flexion: 110 (*pain/parasthesias) degrees   Abduction: 88 (*pain/parasthesias) degrees   External rotation 45°: 92 (*pain/parasthesias) degrees   Internal rotation 45°: 60 (*pain/parasthesias) degrees     Passive Range of Motion     Right Shoulder   Flexion: 156 (*pain/parasthesias) degrees   Abduction: 100 (*parasthesias/pain) degrees   Internal rotation 90°: 60 (*pain/parasthesias) degrees     Strength/Myotome Testing     Right Shoulder     Planes of Motion   Flexion: 4+ (*pain/parasthesias)   Abduction: 4+ (*parasthesias/pain)   External rotation at 45°: 5   Internal rotation at 45°: 5     Additional Strength Details  Scap Stabilizers (R/L):   MT/rhomboids (row/elbow extended): 4 *pain/4 *pain    Plan to assess  strength in the near future    General Comments:      Shoulder Comments   Observation/Posture:  Pt sits w/ fwd, rounded shoulders w/ fwd head    DTR (R):  Triceps: 1+  Biceps: 1+    Palpation: Hypertonicity/Tenderness w/ palpation to RUT musculature; tenderness w/ palpation to R anterior shoulder, R lat delt/triceps/biceps    c/s special tests:   sharp-katharina: negative  vertebral artery: negative  Spurlings:positive pain/parasthesias w/ b/l lat flexions  Bill's: positive pain in RUT/parasthesias in lateral RUE    Shoulder Special Tests:   Shawnee Salter (R): positive  Empty Can (R):positive  Neer Impingement (R): positive  Speed's Test (R): positive                Daily Treatment Diary    EPOC: 11/15/19  Precautions: chronic R hand/wrist pain due to previous sx; HTN- takes meds  Co- Morbidities:   Patient Active Problem List   Diagnosis    Articular cartilage disorder of right wrist    Contusion of bone    Tendonitis    Tendinitis of right shoulder       Manual 9/20                   R GHJ mobs NV           R Shoulder PROM  NV                    TPR/STM to RUT/pec  RS                    1st rib mob  RS                                          Total Time 10'                     Exercise Diary  9/20                   HEP instruct/handout 5'           Pendulums   m/l &a/p                     Post shoulder rolls                     Scap Retraction                     UT stretch- b/l                     T/s rotation                     T/s extension over 1/2 foam roll                     TB resisted UT strengthening (ecc focus)                     Supine AA sh flexion                     Supine scap stab                      Mod prone rows                      Mod prone ext                      consider median/radial n  glides                                                                                                                                                                                  Modalities  9/20                   CP to R shoulder  10'

## 2019-09-25 ENCOUNTER — OFFICE VISIT (OUTPATIENT)
Dept: PHYSICAL THERAPY | Facility: CLINIC | Age: 49
End: 2019-09-25
Payer: COMMERCIAL

## 2019-09-25 DIAGNOSIS — M77.8 TENDINITIS OF RIGHT SHOULDER: Primary | ICD-10-CM

## 2019-09-25 DIAGNOSIS — M54.10 RADICULOPATHY OF ARM: ICD-10-CM

## 2019-09-25 PROCEDURE — 97010 HOT OR COLD PACKS THERAPY: CPT | Performed by: PHYSICAL THERAPIST

## 2019-09-25 PROCEDURE — 97140 MANUAL THERAPY 1/> REGIONS: CPT | Performed by: PHYSICAL THERAPIST

## 2019-09-25 PROCEDURE — 97014 ELECTRIC STIMULATION THERAPY: CPT | Performed by: PHYSICAL THERAPIST

## 2019-09-25 NOTE — PROGRESS NOTES
Daily Note     Today's date: 2019  Patient name: Reese Ocampo  : 1970  MRN: [id number]  Referring provider: Juno Arreola MD  Dx:   Encounter Diagnosis     ICD-10-CM    1  Tendinitis of right shoulder M75 81    2  Radiculopathy of arm M54 10                   Subjective: Pt reports being painful after IE  He reports attempting HEP since LV w/ some discomfort  Objective: See treatment diary below      Assessment: Initiated pt's plan of care this visit w/ fair tolerance  Pt limited w/ ex by pain/parasthesias  Some improvement in "tightness" reported w/ manual n  Glides  Overall pt reports improved pain level post TENS/CP  Patient would benefit from continued PT      Plan: Continue per plan of care  Daily Treatment Diary    EPOC: 11/15/19  Precautions: chronic R hand/wrist pain due to previous sx; HTN- takes meds  Co- Morbidities:   Patient Active Problem List   Diagnosis    Articular cartilage disorder of right wrist    Contusion of bone    Tendonitis    Tendinitis of right shoulder       Manual              R GHJ mobs NV RS        R Shoulder PROM  NV  RS              TPR/STM to RUT/pec/scap/t/s psp  RS  RS (seated & prone)              1st rib mob  RS  RS              Prone t/s mobs- L u/l NV  RS             Manual radial/median n/ glides  RS         Total Time 10' 25'                         Exercise Diary                 HEP instruct/handout 5'          Pendulums   m/l &a/p    NV               Post shoulder rolls    20x               Scap Retraction    10"x10               UT stretch- b/l    NV               T/s rotation   1-2"x5 b/l *pain               T/s extension over 1/2 foam roll    attempted *pain               TB resisted UT strengthening (ecc focus)                   Supine AA sh flexion    *parastheias               Supine scap stab                    Mod prone rows                    Mod prone ext                    consider median/radial n  kaila    manual                                                                                                                                                              Modalities  9/20 9/25               CP to R shoulder  10'  10'                TENS to R scap/shoulder during CP    10'

## 2019-10-02 ENCOUNTER — OFFICE VISIT (OUTPATIENT)
Dept: PHYSICAL THERAPY | Facility: CLINIC | Age: 49
End: 2019-10-02
Payer: COMMERCIAL

## 2019-10-02 DIAGNOSIS — M54.10 RADICULOPATHY OF ARM: ICD-10-CM

## 2019-10-02 DIAGNOSIS — M77.8 TENDINITIS OF RIGHT SHOULDER: Primary | ICD-10-CM

## 2019-10-02 PROCEDURE — 97140 MANUAL THERAPY 1/> REGIONS: CPT | Performed by: PHYSICAL THERAPIST

## 2019-10-02 PROCEDURE — 97014 ELECTRIC STIMULATION THERAPY: CPT | Performed by: PHYSICAL THERAPIST

## 2019-10-02 NOTE — PROGRESS NOTES
Daily Note     Today's date: 10/2/2019  Patient name: David Ahumada  : 1970  MRN: [id number]  Referring provider: You Hansen MD  Dx:   Encounter Diagnosis     ICD-10-CM    1  Tendinitis of right shoulder M75 81    2  Radiculopathy of arm M54 10                   Subjective: Pt reports significant improvement in symptoms after LV- he adds that TENS seemed to reduce parasthesias significantly  However, pain returned after working- states that even though he is on light duty he has to repetitively use RUE and lift light weight which causes increased pain  Pt continues to have significant pain today  Objective: See treatment diary below      Assessment: Focused on manuals and modalities this visit- improved mobility w/ manuals and slight improvement in pain; however pt reports that most pain relief post CP + TENS  Pt very interested in home TENS unit  Plan: Continue per plan of care        Daily Treatment Diary    EPOC: 11/15/19  Precautions: chronic R hand/wrist pain due to previous sx; HTN- takes meds  Co- Morbidities:   Patient Active Problem List   Diagnosis    Articular cartilage disorder of right wrist    Contusion of bone    Tendonitis    Tendinitis of right shoulder       Manual   10/2       R GHJ mobs NV RS RS     R Shoulder PROM  NV  RS  RS        TPR/STM to RUT/pec/scap/t/s psp  RS  RS (seated & prone)  RS (seated)        1st rib mob  RS  RS  RS        Prone t/s mobs- L u/l NV  RS  RS       Manual radial/median n/ glides  RS RS      Total Time 10' 25'  32'                 Exercise Diary    102     HEP instruct/handout 5'      Pendulums   m/l &a/p    NV  consider NV    Post shoulder rolls    20x  HEP     Scap Retraction    10"x10  HEP     UT stretch- b/l    NV  NV     T/s rotation   1-2"x5 b/l *pain  NP- resume NV     T/s extension over 1/2 foam roll    attempted *pain  consider attempting again in sev visits     TB resisted UT strengthening (ecc focus) NV     Supine AA sh flexion    *parastheias  1-2"x10 w/ cANE     Supine scap stab            Mod prone rows            Mod prone ext            consider median/radial n  glides    manual manual                                                                                            Modalities  9/20  9/25  10/2           CP to R shoulder  10'  10'  10'            TENS to R scap/shoulder during CP    10' T/o ex and CP

## 2019-10-10 ENCOUNTER — OFFICE VISIT (OUTPATIENT)
Dept: PHYSICAL THERAPY | Facility: CLINIC | Age: 49
End: 2019-10-10
Payer: COMMERCIAL

## 2019-10-10 DIAGNOSIS — M77.8 TENDINITIS OF RIGHT SHOULDER: Primary | ICD-10-CM

## 2019-10-10 DIAGNOSIS — M54.10 RADICULOPATHY OF ARM: ICD-10-CM

## 2019-10-10 PROCEDURE — 97110 THERAPEUTIC EXERCISES: CPT

## 2019-10-10 PROCEDURE — 97140 MANUAL THERAPY 1/> REGIONS: CPT

## 2019-10-10 PROCEDURE — 97014 ELECTRIC STIMULATION THERAPY: CPT

## 2019-10-10 NOTE — PROGRESS NOTES
Daily Note     Today's date: 10/10/2019  Patient name: Yumiko Ceja  : 1970  MRN: [id number]  Referring provider: Roberto Recinos MD  Dx:   Encounter Diagnosis     ICD-10-CM    1  Tendinitis of right shoulder M75 81    2  Radiculopathy of arm M54 10                   Subjective: Pt reports his shld is still very sore  Reports not much change in sxs  Objective: See treatment diary below      Assessment: Tolerated treatment fair  Patient given home TENS unit and explained in use  Pt w/ c/o's mostly in flexion and IR  Plan: Continue per plan of care        Daily Treatment Diary    EPOC: 11/15/19  Precautions: chronic R hand/wrist pain due to previous sx; HTN- takes meds  Co- Morbidities:   Patient Active Problem List   Diagnosis    Articular cartilage disorder of right wrist    Contusion of bone    Tendonitis    Tendinitis of right shoulder       Manual 9/20  9/25  10/2  10/10     R GHJ mobs NV RS RS     R Shoulder PROM  NV  RS  RS  JK      TPR/STM to RUT/pec/scap/t/s psp  RS  RS (seated & prone)  RS (seated)  JK      1st rib mob  RS  RS  RS       scap PROM    JK     Prone t/s mobs- L u/l NV  RS  RS       Manual radial/median n/ glides  RS RS JK     Total Time 10' 25'  32'  23'               Exercise Diary  9/20  9/25  10/2  10/10   HEP instruct/handout 5'      Pendulums   m/l &a/p    NV  consider NV    Post shoulder rolls    20x  HEP     Scap Retraction    10"x10  HEP     UT stretch- b/l    NV  NV     T/s rotation   1-2"x5 b/l *pain  NP- resume NV  1-2" x6 pain   T/s extension over 1/2 foam roll    attempted *pain  consider attempting again in sev visits  10x  1-2"hold   TB resisted UT strengthening (ecc focus)      NV  NV   Supine AA sh flexion    *parastheias  1-2"x10 w/ cANE   1-2"x10 w/ cANE    Supine scap stab            Mod prone rows            Mod prone ext            consider median/radial n  glides    manual manual  manual    supine AA ER        5"x10 Modalities  9/20  9/25  10/2  10/10         CP to R shoulder  10'  10'  10'  10'          TENS to R scap/shoulder during CP    10' T/o ex and CP  10'

## 2019-10-15 ENCOUNTER — OFFICE VISIT (OUTPATIENT)
Dept: PHYSICAL THERAPY | Facility: CLINIC | Age: 49
End: 2019-10-15
Payer: COMMERCIAL

## 2019-10-15 DIAGNOSIS — M54.10 RADICULOPATHY OF ARM: ICD-10-CM

## 2019-10-15 DIAGNOSIS — M77.8 TENDINITIS OF RIGHT SHOULDER: Primary | ICD-10-CM

## 2019-10-15 PROCEDURE — 97010 HOT OR COLD PACKS THERAPY: CPT

## 2019-10-15 NOTE — PROGRESS NOTES
Daily Note     Today's date: 10/15/2019  Patient name: Yumiko Ceja  : 1970  MRN: [id number]  Referring provider: Roberto Recinos MD  Dx:   Encounter Diagnosis     ICD-10-CM    1  Tendinitis of right shoulder M75 81    2  Radiculopathy of arm M54 10                   Subjective: Pt reports he still is getting a lot of pain in the shld  Reports getting some relief at home w/ TENS unit  Objective: See treatment diary below      Assessment: Tolerated treatment fair  Patient w/ tenderness and tightness t/o the shld jt , pec, bicep, terres and UT  Plan: Continue per plan of care  Progress treatment as tolerated  Daily Treatment Diary    EPOC: 11/15/19  Precautions: chronic R hand/wrist pain due to previous sx; HTN- takes meds  Co- Morbidities:   Patient Active Problem List   Diagnosis    Articular cartilage disorder of right wrist    Contusion of bone    Tendonitis    Tendinitis of right shoulder       Manual 9/20  9/25  10/2  10/10  10/15   R GHJ mobs NV RS RS     R Shoulder PROM  NV  RS  RS  JK  JK    TPR/STM to RUT/pec/scap/t/s psp  RS  RS (seated & prone)  RS (seated)  JK  JK    1st rib mob  RS  RS  RS       scap PROM    JK NV    Prone t/s mobs- L u/l NV  RS  RS       Manual radial/median n/ glides  RS RS JK JK    Total Time 10' 25'  32'  23'  23'             Exercise Diary  9/20  9/25  10/2  10/10 10/15   HEP instruct/handout 5'       Pendulums   m/l &a/p    NV  consider NV     Post shoulder rolls    20x  HEP      Scap Retraction    10"x10  HEP      UT stretch- b/l    NV  NV      T/s rotation   1-2"x5 b/l *pain  NP- resume NV  1-2" x6 pain 1-2"x6 p!    T/s extension over 1/2 foam roll    attempted *pain  consider attempting again in sev visits  10x  1-2"hold 10x 1-2"   TB resisted UT strengthening (ecc focus)      NV  NV 10x tingling   Supine AA sh flexion    *parastheias  1-2"x10 w/ cANE   1-2"x10 w/ cANE   1-2"x10 w/ cANE    Supine scap stab         10x    Mod prone rows 10x    Mod prone ext         10x    consider median/radial n  glides    manual manual  manual maunal    supine AA ER        5"x10 5"x10                                                                                    Modalities  9/20  9/25  10/2  10/10  10/15       CP to R shoulder  10'  10'  10'  10'  10'        TENS to R scap/shoulder during CP    10' T/o ex and CP  10'  home unit

## 2019-10-29 ENCOUNTER — OFFICE VISIT (OUTPATIENT)
Dept: PHYSICAL THERAPY | Facility: CLINIC | Age: 49
End: 2019-10-29
Payer: COMMERCIAL

## 2019-10-29 DIAGNOSIS — M77.8 TENDINITIS OF RIGHT SHOULDER: Primary | ICD-10-CM

## 2019-10-29 DIAGNOSIS — M54.10 RADICULOPATHY OF ARM: ICD-10-CM

## 2019-10-29 PROCEDURE — 97140 MANUAL THERAPY 1/> REGIONS: CPT

## 2019-10-29 PROCEDURE — 97010 HOT OR COLD PACKS THERAPY: CPT

## 2019-10-29 PROCEDURE — 97110 THERAPEUTIC EXERCISES: CPT

## 2019-10-29 NOTE — PROGRESS NOTES
Daily Note     Today's date: 10/29/2019  Patient name: Vladimir Joaquin  : 1970  MRN: [id number]  Referring provider: Mario Posadas MD  Dx: No diagnosis found  Subjective: Pt reports limiting his PT visits due high co-pay  Pt reports not much change in his sxs  States having numbness and weakness in to the hand  Objective: See treatment diary below      Assessment: Tolerated treatment poor  Patient w/ neural tension in R shld  Pt w/ visible atrophy in scap region  Pt may benefit from an evaluation of the cspine  Pt w/ TrP in the UT, terres minor  Plan: Continue per plan of care  Daily Treatment Diary    EPOC: 11/15/19  Precautions: chronic R hand/wrist pain due to previous sx; HTN- takes meds  Co- Morbidities:   Patient Active Problem List   Diagnosis    Articular cartilage disorder of right wrist    Contusion of bone    Tendonitis    Tendinitis of right shoulder       Manual 9/20  9/25  10/2  10/10  10/15 10/29   R GHJ mobs NV RS RS      R Shoulder PROM  NV  RS  RS  JK  JK Jk    TPR/STM to RUT/pec/scap/t/s psp  RS  RS (seated & prone)  RS (seated)  JK  JK JK    1st rib mob  RS  RS  RS        scap PROM    JK NV     Prone t/s mobs- L u/l NV  RS  RS        Manual radial/median n/ glides  RS RS JK JK JK    Total Time 10' 25'  32'  23'  23' 25'              Exercise Diary  9/20  9/25  10/2  10/10 10/15 10/29   HEP instruct/handout 5'        Pendulums   m/l &a/p    NV  consider NV      Post shoulder rolls    20x  HEP       Scap Retraction    10"x10  HEP    10x10"   UT stretch- b/l    NV  NV    Active 10x   T/s rotation   1-2"x5 b/l *pain  NP- resume NV  1-2" x6 pain 1-2"x6 p!     T/s extension over 1/2 foam roll    attempted *pain  consider attempting again in sev visits  10x  1-2"hold 10x 1-2"    TB resisted UT strengthening (ecc focus)      NV  NV 10x tingling    Supine AA sh flexion    *parastheias  1-2"x10 w/ cANE   1-2"x10 w/ cANE   1-2"x10 w/ cANE     Supine scap stab 10x     Mod prone rows         10x     Mod prone ext         10x     consider median/radial n  glides    manual manual  manual maunal     supine AA ER        5"x10 5"x10     prone scap squeeze and hold          10"x10                                                                            Modalities  9/20  9/25  10/2  10/10  10/15  10/29     CP to R shoulder  10'  10'  10'  10'  10'  10'      TENS to R scap/shoulder during CP    10' T/o ex and CP  10'  home unit

## 2019-10-29 NOTE — TELEPHONE ENCOUNTER
Patient is calling stating that he is unable to lift 20 pounds and even in physical therapy he feels like he is not progressing  He states that he cannot  any weight with the arm without it causing pain  Would like the doctor to call him

## 2019-10-29 NOTE — TELEPHONE ENCOUNTER
Patient has follow up on 11/5  Dr Hussain Newsome will discuss further treatment options with him at the time if there is no improvement and he will discuss work restrictions

## 2019-11-05 ENCOUNTER — OFFICE VISIT (OUTPATIENT)
Dept: OBGYN CLINIC | Facility: CLINIC | Age: 49
End: 2019-11-05
Payer: COMMERCIAL

## 2019-11-05 VITALS
HEIGHT: 70 IN | WEIGHT: 209 LBS | BODY MASS INDEX: 29.92 KG/M2 | SYSTOLIC BLOOD PRESSURE: 180 MMHG | HEART RATE: 88 BPM | DIASTOLIC BLOOD PRESSURE: 100 MMHG

## 2019-11-05 DIAGNOSIS — M25.511 ACUTE PAIN OF RIGHT SHOULDER: Primary | ICD-10-CM

## 2019-11-05 PROCEDURE — 99213 OFFICE O/P EST LOW 20 MIN: CPT | Performed by: ORTHOPAEDIC SURGERY

## 2019-11-05 NOTE — LETTER
November 5, 2019     Patient: Deondre Taylor   YOB: 1970   Date of Visit: 11/5/2019       To Whom it May Concern:    Roshan Becker is under my professional care  He was seen in my office on 11/5/2019  He is not able to use right arm in lifting, pulling, pushing, and doing repetitive activities until further notice  If you have any questions or concerns, please don't hesitate to call           Sincerely,          Terrence Llanes MD        CC: No Recipients

## 2019-11-05 NOTE — PROGRESS NOTES
Assessment:     1  Acute pain of right shoulder        Plan:     Problem List Items Addressed This Visit        Other    Acute pain of right shoulder - Primary     Findings consistent with right shoulder pain  Discussed findings and treatment options with the patient  Patient continued to have pain in his right shoulder despite conservative treatment with injections and therapy  We will check MRI of his right shoulder to assess the labrum and rotator cuff  I will make further treatment recommendation after his right shoulder MRI  I provided patient a work note to limit use of the right arm  All patient's questions were answered to his satisfaction  This note is created using dictation transcription  It may contain typographical errors, grammatical errors, improperly dictated words, background noise and other errors  Relevant Orders    MRI shoulder right wo contrast         Subjective:     Patient ID: Brielle Hernadez is a 46 y o  male  Chief Complaint:  49-year-old male return follow-up right shoulder pain  Patient had received cortisone injection and has been attending physical therapy but his shoulder pain persist   He denies prior injury to his right shoulder but attributes his symptoms to repetitive lifting, carrying, and overhead activity at work  He denies pain in his neck  He also complaining of numbing sensation radiate down the right arm  He had prior right wrist problem that was treated by Dr [facility]  His wrist pain has persist   He continued to have difficulty with overhead and lifting activities      Allergy:  Allergies   Allergen Reactions    Bee Venom Edema     Pt reports localized edema around area of sting     Medications:  all current active meds have been reviewed  Past Medical History:  Past Medical History:   Diagnosis Date    Articular cartilage disorder of right wrist     Bronchitis     recent    Hypertension     Tobacco abuse      Past Surgical History:  Past Surgical History:   Procedure Laterality Date    ARTHROSCOPY WRIST Right 1/24/2017    Procedure: ARTHROSCOPY WRIST WITH TFCC REPAIR; PARTIAL SYNOVECTOMY; SPLINT APPLICATION;  Surgeon: Mady Hunt MD;  Location: [facility];  Service:     COLONOSCOPY      WRIST SURGERY Right 01/2017     Family History:  Family History   Problem Relation Age of Onset    Cancer Father      Social History:  Social History     Substance and Sexual Activity   Alcohol Use Yes    Alcohol/week: 18 0 standard drinks    Types: 18 Cans of beer per week     Social History     Substance and Sexual Activity   Drug Use No     Social History     Tobacco Use   Smoking Status Current Every Day Smoker    Packs/day: 1 00    Years: 20 00    Pack years: 20 00    Types: Cigarettes   Smokeless Tobacco Never Used   Tobacco Comment    1ppd X 30 yrs  Review of Systems   Constitutional: Negative  HENT: Negative  Eyes: Negative  Respiratory: Negative  Cardiovascular: Negative  Gastrointestinal: Negative  Endocrine: Negative  Genitourinary: Negative  Musculoskeletal: Positive for arthralgias (Right shoulder)  Skin: Negative  Neurological: Positive for weakness (Right arm) and numbness (Right upper extremity)  Hematological: Negative  Psychiatric/Behavioral: Negative  Objective:  BP Readings from Last 1 Encounters:   11/05/19 (!) 180/100      Wt Readings from Last 1 Encounters:   11/05/19 94 8 kg (209 lb)      BMI:   Estimated body mass index is 29 99 kg/m² as calculated from the following:    Height as of this encounter: 5' 10" (1 778 m)  Weight as of this encounter: 94 8 kg (209 lb)  BSA:   Estimated body surface area is 2 13 meters squared as calculated from the following:    Height as of this encounter: 5' 10" (1 778 m)  Weight as of this encounter: 94 8 kg (209 lb)  Physical Exam   Constitutional: He is oriented to person, place, and time  He appears well-developed     HENT:   Head: Normocephalic and atraumatic  Eyes: Conjunctivae and EOM are normal    Neck: Neck supple  Pulmonary/Chest: Effort normal    Neurological: He is alert and oriented to person, place, and time  Skin: Skin is warm  Psychiatric: He has a normal mood and affect  Nursing note and vitals reviewed  Right Shoulder Exam     Tenderness   Right shoulder tenderness location: Anteriorly  Range of Motion   The patient has normal right shoulder ROM  Right shoulder active abduction: Pain  Right shoulder passive abduction: Pain  Right shoulder forward flexion: Pain  Right shoulder internal rotation 0 degrees: Pain       Muscle Strength   Abduction: 4/5   Internal rotation: 5/5   External rotation: 4/5   Biceps: 5/5     Tests   Apprehension: negative  Cross arm: negative  Impingement: positive  Drop arm: negative    Other   Erythema: absent  Sensation: normal  Pulse: present            No new images for review

## 2019-11-05 NOTE — ASSESSMENT & PLAN NOTE
Findings consistent with right shoulder pain  Discussed findings and treatment options with the patient  Patient continued to have pain in his right shoulder despite conservative treatment with injections and therapy  We will check MRI of his right shoulder to assess the labrum and rotator cuff  I will make further treatment recommendation after his right shoulder MRI  I provided patient a work note to limit use of the right arm  All patient's questions were answered to his satisfaction  This note is created using dictation transcription  It may contain typographical errors, grammatical errors, improperly dictated words, background noise and other errors

## 2021-04-08 DIAGNOSIS — Z23 ENCOUNTER FOR IMMUNIZATION: ICD-10-CM

## (undated) DEVICE — CHLORAPREP HI-LITE 26ML ORANGE

## (undated) DEVICE — INTENDED FOR TISSUE SEPARATION, AND OTHER PROCEDURES THAT REQUIRE A SHARP SURGICAL BLADE TO PUNCTURE OR CUT.: Brand: BARD-PARKER SAFETY BLADES SIZE 15, STERILE

## (undated) DEVICE — PADDING CAST 4 IN  COTTON STRL

## (undated) DEVICE — NEEDLE 18 G X 1 1/2

## (undated) DEVICE — STRAPS, 2 OF 24", 1 OF 36": Brand: ACUMED

## (undated) DEVICE — DISPOSABLE EQUIPMENT COVER: Brand: SMALL TOWEL DRAPE

## (undated) DEVICE — TUBING SUCTION 5MM X 12 FT

## (undated) DEVICE — CUFF TOURNIQUET 18 X 4 IN QUICK CONNECT DISP 1 BLADDER

## (undated) DEVICE — IV SET ANES/PUMP

## (undated) DEVICE — COBAN 4 IN STERILE

## (undated) DEVICE — GAUZE SPONGES,16 PLY: Brand: CURITY

## (undated) DEVICE — OCCLUSIVE GAUZE STRIP,3% BISMUTH TRIBROMOPHENATE IN PETROLATUM BLEND: Brand: XEROFORM

## (undated) DEVICE — STERLING GATOR SHAVER BLADE, 2.9 MM: Brand: STERLING GATOR

## (undated) DEVICE — PACK PLASTIC HAND PBDS

## (undated) DEVICE — SUT PROLENE 4-0 PS-2 18 IN 8682H

## (undated) DEVICE — ACE WRAP 3 IN STERILE

## (undated) DEVICE — GLOVE INDICATOR PI UNDERGLOVE SZ 8 BLUE

## (undated) DEVICE — NEEDLE 25G X 1 1/2

## (undated) DEVICE — SPONGE PVP SCRUB WING STERILE

## (undated) DEVICE — GLOVE SRG BIOGEL 7.5